# Patient Record
Sex: FEMALE | Race: ASIAN | Employment: OTHER | URBAN - METROPOLITAN AREA
[De-identification: names, ages, dates, MRNs, and addresses within clinical notes are randomized per-mention and may not be internally consistent; named-entity substitution may affect disease eponyms.]

---

## 2024-01-01 ENCOUNTER — APPOINTMENT (OUTPATIENT)
Dept: GENERAL RADIOLOGY | Facility: HOSPITAL | Age: 69
DRG: 870 | End: 2024-01-01
Attending: INTERNAL MEDICINE

## 2024-01-01 ENCOUNTER — APPOINTMENT (OUTPATIENT)
Dept: CT IMAGING | Facility: HOSPITAL | Age: 69
DRG: 870 | End: 2024-01-01
Attending: EMERGENCY MEDICINE

## 2024-01-01 ENCOUNTER — HOSPITAL ENCOUNTER (INPATIENT)
Facility: HOSPITAL | Age: 69
LOS: 5 days | DRG: 870 | End: 2024-01-01
Attending: EMERGENCY MEDICINE | Admitting: HOSPITALIST

## 2024-01-01 ENCOUNTER — APPOINTMENT (OUTPATIENT)
Dept: GENERAL RADIOLOGY | Facility: HOSPITAL | Age: 69
DRG: 870 | End: 2024-01-01
Attending: EMERGENCY MEDICINE

## 2024-01-01 ENCOUNTER — APPOINTMENT (OUTPATIENT)
Dept: GENERAL RADIOLOGY | Facility: HOSPITAL | Age: 69
DRG: 870 | End: 2024-01-01
Attending: RADIOLOGY

## 2024-01-01 ENCOUNTER — APPOINTMENT (OUTPATIENT)
Dept: CV DIAGNOSTICS | Facility: HOSPITAL | Age: 69
DRG: 870 | End: 2024-01-01
Attending: INTERNAL MEDICINE

## 2024-01-01 ENCOUNTER — APPOINTMENT (OUTPATIENT)
Dept: CT IMAGING | Facility: HOSPITAL | Age: 69
DRG: 870 | End: 2024-01-01
Attending: Other

## 2024-01-01 ENCOUNTER — APPOINTMENT (OUTPATIENT)
Dept: MRI IMAGING | Facility: HOSPITAL | Age: 69
DRG: 870 | End: 2024-01-01
Attending: Other

## 2024-01-01 ENCOUNTER — APPOINTMENT (OUTPATIENT)
Dept: INTERVENTIONAL RADIOLOGY/VASCULAR | Facility: HOSPITAL | Age: 69
DRG: 870 | End: 2024-01-01
Attending: INTERNAL MEDICINE

## 2024-01-01 VITALS
WEIGHT: 177.69 LBS | HEIGHT: 66 IN | SYSTOLIC BLOOD PRESSURE: 77 MMHG | DIASTOLIC BLOOD PRESSURE: 33 MMHG | OXYGEN SATURATION: 78 % | TEMPERATURE: 100 F | BODY MASS INDEX: 28.56 KG/M2

## 2024-01-01 DIAGNOSIS — E87.5 HYPERKALEMIA: ICD-10-CM

## 2024-01-01 DIAGNOSIS — E87.20 LACTIC ACIDOSIS: ICD-10-CM

## 2024-01-01 DIAGNOSIS — R73.9 HYPERGLYCEMIA: ICD-10-CM

## 2024-01-01 DIAGNOSIS — I46.9 CARDIAC ARREST (HCC): Primary | ICD-10-CM

## 2024-01-01 DIAGNOSIS — N17.9 ACUTE KIDNEY INJURY (HCC): ICD-10-CM

## 2024-01-01 DIAGNOSIS — R74.01 TRANSAMINITIS: ICD-10-CM

## 2024-01-01 DIAGNOSIS — R79.89 ELEVATED TROPONIN: ICD-10-CM

## 2024-01-01 DIAGNOSIS — J90 PLEURAL EFFUSION: ICD-10-CM

## 2024-01-01 LAB
ALBUMIN SERPL-MCNC: 2.2 G/DL (ref 3.2–4.8)
ALBUMIN SERPL-MCNC: 2.4 G/DL (ref 3.2–4.8)
ALBUMIN SERPL-MCNC: 2.5 G/DL (ref 3.2–4.8)
ALBUMIN SERPL-MCNC: 2.7 G/DL (ref 3.2–4.8)
ALBUMIN SERPL-MCNC: 2.9 G/DL (ref 3.2–4.8)
ALBUMIN SERPL-MCNC: 3.3 G/DL (ref 3.2–4.8)
ALBUMIN/GLOB SERPL: 1.2 {RATIO} (ref 1–2)
ALP LIVER SERPL-CCNC: 108 U/L
ALT SERPL-CCNC: 338 U/L
ANION GAP SERPL CALC-SCNC: 11 MMOL/L (ref 0–18)
ANION GAP SERPL CALC-SCNC: 11 MMOL/L (ref 0–18)
ANION GAP SERPL CALC-SCNC: 14 MMOL/L (ref 0–18)
ANION GAP SERPL CALC-SCNC: 15 MMOL/L (ref 0–18)
ANION GAP SERPL CALC-SCNC: 3 MMOL/L (ref 0–18)
ANION GAP SERPL CALC-SCNC: 4 MMOL/L (ref 0–18)
ANION GAP SERPL CALC-SCNC: 5 MMOL/L (ref 0–18)
ANION GAP SERPL CALC-SCNC: 5 MMOL/L (ref 0–18)
ANION GAP SERPL CALC-SCNC: 6 MMOL/L (ref 0–18)
ANION GAP SERPL CALC-SCNC: 6 MMOL/L (ref 0–18)
ANION GAP SERPL CALC-SCNC: 7 MMOL/L (ref 0–18)
ANION GAP SERPL CALC-SCNC: 7 MMOL/L (ref 0–18)
ANION GAP SERPL CALC-SCNC: 8 MMOL/L (ref 0–18)
ANTIBODY SCREEN: NEGATIVE
AST SERPL-CCNC: 682 U/L (ref ?–34)
ATRIAL RATE: 72 BPM
BASE EXCESS BLD CALC-SCNC: -10.1 MMOL/L (ref ?–2)
BASE EXCESS BLD CALC-SCNC: -18.8 MMOL/L (ref ?–2)
BASOPHILS # BLD AUTO: 0.01 X10(3) UL (ref 0–0.2)
BASOPHILS # BLD AUTO: 0.02 X10(3) UL (ref 0–0.2)
BASOPHILS # BLD AUTO: 0.04 X10(3) UL (ref 0–0.2)
BASOPHILS NFR BLD AUTO: 0.1 %
BASOPHILS NFR BLD AUTO: 0.1 %
BASOPHILS NFR BLD AUTO: 0.2 %
BASOPHILS NFR BLD AUTO: 0.3 %
BILIRUB SERPL-MCNC: <0.2 MG/DL (ref 0.2–1.1)
BLOOD TYPE BARCODE: 6200
BUN BLD-MCNC: 14 MG/DL (ref 9–23)
BUN BLD-MCNC: 14 MG/DL (ref 9–23)
BUN BLD-MCNC: 15 MG/DL (ref 9–23)
BUN BLD-MCNC: 17 MG/DL (ref 9–23)
BUN BLD-MCNC: 18 MG/DL (ref 9–23)
BUN BLD-MCNC: 19 MG/DL (ref 9–23)
BUN BLD-MCNC: 22 MG/DL (ref 9–23)
BUN BLD-MCNC: 22 MG/DL (ref 9–23)
BUN BLD-MCNC: 23 MG/DL (ref 9–23)
BUN BLD-MCNC: 30 MG/DL (ref 9–23)
BUN BLD-MCNC: 32 MG/DL (ref 9–23)
BUN BLD-MCNC: 34 MG/DL (ref 9–23)
BUN BLD-MCNC: 59 MG/DL (ref 9–23)
BUN BLD-MCNC: 62 MG/DL (ref 9–23)
BUN BLD-MCNC: 66 MG/DL (ref 9–23)
BUN BLD-MCNC: 70 MG/DL (ref 9–23)
BUN/CREAT SERPL: 7.2 (ref 10–20)
BUN/CREAT SERPL: 7.4 (ref 10–20)
BUN/CREAT SERPL: 7.6 (ref 10–20)
BUN/CREAT SERPL: 7.7 (ref 10–20)
BUN/CREAT SERPL: 7.8 (ref 10–20)
BUN/CREAT SERPL: 8 (ref 10–20)
BUN/CREAT SERPL: 8.1 (ref 10–20)
BUN/CREAT SERPL: 8.3 (ref 10–20)
BUN/CREAT SERPL: 8.5 (ref 10–20)
BUN/CREAT SERPL: 8.5 (ref 10–20)
BUN/CREAT SERPL: 8.9 (ref 10–20)
C DIFF TOX B STL QL: NEGATIVE
CALCIUM BLD-MCNC: 7.4 MG/DL (ref 8.7–10.4)
CALCIUM BLD-MCNC: 7.5 MG/DL (ref 8.7–10.4)
CALCIUM BLD-MCNC: 7.5 MG/DL (ref 8.7–10.4)
CALCIUM BLD-MCNC: 7.6 MG/DL (ref 8.7–10.4)
CALCIUM BLD-MCNC: 7.6 MG/DL (ref 8.7–10.4)
CALCIUM BLD-MCNC: 7.7 MG/DL (ref 8.7–10.4)
CALCIUM BLD-MCNC: 7.7 MG/DL (ref 8.7–10.4)
CALCIUM BLD-MCNC: 7.8 MG/DL (ref 8.7–10.4)
CALCIUM BLD-MCNC: 7.8 MG/DL (ref 8.7–10.4)
CALCIUM BLD-MCNC: 7.9 MG/DL (ref 8.7–10.4)
CALCIUM BLD-MCNC: 8 MG/DL (ref 8.7–10.4)
CALCIUM BLD-MCNC: 8.1 MG/DL (ref 8.7–10.4)
CALCIUM BLD-MCNC: 8.3 MG/DL (ref 8.7–10.4)
CALCIUM BLD-MCNC: 8.8 MG/DL (ref 8.7–10.4)
CHLORIDE SERPL-SCNC: 104 MMOL/L (ref 98–112)
CHLORIDE SERPL-SCNC: 105 MMOL/L (ref 98–112)
CHLORIDE SERPL-SCNC: 106 MMOL/L (ref 98–112)
CHLORIDE SERPL-SCNC: 107 MMOL/L (ref 98–112)
CHLORIDE SERPL-SCNC: 108 MMOL/L (ref 98–112)
CHLORIDE SERPL-SCNC: 110 MMOL/L (ref 98–112)
CHLORIDE SERPL-SCNC: 111 MMOL/L (ref 98–112)
CHLORIDE SERPL-SCNC: 112 MMOL/L (ref 98–112)
CHLORIDE SERPL-SCNC: 113 MMOL/L (ref 98–112)
CHOLEST SERPL-MCNC: 225 MG/DL (ref ?–200)
CK SERPL-CCNC: 140 U/L
CO2 SERPL-SCNC: 15 MMOL/L (ref 21–32)
CO2 SERPL-SCNC: 16 MMOL/L (ref 21–32)
CO2 SERPL-SCNC: 18 MMOL/L (ref 21–32)
CO2 SERPL-SCNC: 20 MMOL/L (ref 21–32)
CO2 SERPL-SCNC: 22 MMOL/L (ref 21–32)
CO2 SERPL-SCNC: 23 MMOL/L (ref 21–32)
CO2 SERPL-SCNC: 24 MMOL/L (ref 21–32)
CO2 SERPL-SCNC: 25 MMOL/L (ref 21–32)
CO2 SERPL-SCNC: 25 MMOL/L (ref 21–32)
CO2 SERPL-SCNC: 26 MMOL/L (ref 21–32)
CO2 SERPL-SCNC: 26 MMOL/L (ref 21–32)
CO2 SERPL-SCNC: 27 MMOL/L (ref 21–32)
CREAT BLD-MCNC: 1.75 MG/DL
CREAT BLD-MCNC: 1.89 MG/DL
CREAT BLD-MCNC: 2.07 MG/DL
CREAT BLD-MCNC: 2.29 MG/DL
CREAT BLD-MCNC: 2.3 MG/DL
CREAT BLD-MCNC: 2.45 MG/DL
CREAT BLD-MCNC: 2.83 MG/DL
CREAT BLD-MCNC: 2.84 MG/DL
CREAT BLD-MCNC: 2.93 MG/DL
CREAT BLD-MCNC: 3.53 MG/DL
CREAT BLD-MCNC: 4.19 MG/DL
CREAT BLD-MCNC: 4.22 MG/DL
CREAT BLD-MCNC: 6.66 MG/DL
CREAT BLD-MCNC: 7.97 MG/DL
CREAT BLD-MCNC: 8.22 MG/DL
CREAT BLD-MCNC: 8.33 MG/DL
DEPRECATED HBV CORE AB SER IA-ACNC: 379.2 NG/ML
DEPRECATED RDW RBC AUTO: 47.2 FL (ref 35.1–46.3)
DEPRECATED RDW RBC AUTO: 47.7 FL (ref 35.1–46.3)
DEPRECATED RDW RBC AUTO: 47.8 FL (ref 35.1–46.3)
DEPRECATED RDW RBC AUTO: 48.7 FL (ref 35.1–46.3)
DEPRECATED RDW RBC AUTO: 48.8 FL (ref 35.1–46.3)
DEPRECATED RDW RBC AUTO: 51.8 FL (ref 35.1–46.3)
E FAECIUM DNA BLD POS QL NAA+NON-PROBE: DETECTED
EGFRCR SERPLBLD CKD-EPI 2021: 11 ML/MIN/1.73M2 (ref 60–?)
EGFRCR SERPLBLD CKD-EPI 2021: 11 ML/MIN/1.73M2 (ref 60–?)
EGFRCR SERPLBLD CKD-EPI 2021: 13 ML/MIN/1.73M2 (ref 60–?)
EGFRCR SERPLBLD CKD-EPI 2021: 17 ML/MIN/1.73M2 (ref 60–?)
EGFRCR SERPLBLD CKD-EPI 2021: 21 ML/MIN/1.73M2 (ref 60–?)
EGFRCR SERPLBLD CKD-EPI 2021: 22 ML/MIN/1.73M2 (ref 60–?)
EGFRCR SERPLBLD CKD-EPI 2021: 22 ML/MIN/1.73M2 (ref 60–?)
EGFRCR SERPLBLD CKD-EPI 2021: 25 ML/MIN/1.73M2 (ref 60–?)
EGFRCR SERPLBLD CKD-EPI 2021: 28 ML/MIN/1.73M2 (ref 60–?)
EGFRCR SERPLBLD CKD-EPI 2021: 31 ML/MIN/1.73M2 (ref 60–?)
EGFRCR SERPLBLD CKD-EPI 2021: 5 ML/MIN/1.73M2 (ref 60–?)
EGFRCR SERPLBLD CKD-EPI 2021: 6 ML/MIN/1.73M2 (ref 60–?)
EOSINOPHIL # BLD AUTO: 0.01 X10(3) UL (ref 0–0.7)
EOSINOPHIL # BLD AUTO: 0.03 X10(3) UL (ref 0–0.7)
EOSINOPHIL # BLD AUTO: 0.13 X10(3) UL (ref 0–0.7)
EOSINOPHIL # BLD AUTO: 0.14 X10(3) UL (ref 0–0.7)
EOSINOPHIL # BLD AUTO: 0.18 X10(3) UL (ref 0–0.7)
EOSINOPHIL # BLD AUTO: 0.23 X10(3) UL (ref 0–0.7)
EOSINOPHIL NFR BLD AUTO: 0.1 %
EOSINOPHIL NFR BLD AUTO: 0.2 %
EOSINOPHIL NFR BLD AUTO: 1 %
EOSINOPHIL NFR BLD AUTO: 1.1 %
EOSINOPHIL NFR BLD AUTO: 1.4 %
EOSINOPHIL NFR BLD AUTO: 1.8 %
ERYTHROCYTE [DISTWIDTH] IN BLOOD BY AUTOMATED COUNT: 14.9 % (ref 11–15)
ERYTHROCYTE [DISTWIDTH] IN BLOOD BY AUTOMATED COUNT: 15.1 % (ref 11–15)
ERYTHROCYTE [DISTWIDTH] IN BLOOD BY AUTOMATED COUNT: 15.3 % (ref 11–15)
ERYTHROCYTE [DISTWIDTH] IN BLOOD BY AUTOMATED COUNT: 15.3 % (ref 11–15)
ERYTHROCYTE [DISTWIDTH] IN BLOOD BY AUTOMATED COUNT: 15.7 % (ref 11–15)
ERYTHROCYTE [DISTWIDTH] IN BLOOD BY AUTOMATED COUNT: 15.8 % (ref 11–15)
EST. AVERAGE GLUCOSE BLD GHB EST-MCNC: 151 MG/DL (ref 68–126)
ETHANOL SERPL-MCNC: <3 MG/DL (ref ?–3)
GLOBULIN PLAS-MCNC: 2.8 G/DL (ref 2–3.5)
GLUCOSE BLD-MCNC: 100 MG/DL (ref 70–99)
GLUCOSE BLD-MCNC: 106 MG/DL (ref 70–99)
GLUCOSE BLD-MCNC: 112 MG/DL (ref 70–99)
GLUCOSE BLD-MCNC: 126 MG/DL (ref 70–99)
GLUCOSE BLD-MCNC: 134 MG/DL (ref 70–99)
GLUCOSE BLD-MCNC: 139 MG/DL (ref 70–99)
GLUCOSE BLD-MCNC: 156 MG/DL (ref 70–99)
GLUCOSE BLD-MCNC: 251 MG/DL (ref 70–99)
GLUCOSE BLD-MCNC: 251 MG/DL (ref 70–99)
GLUCOSE BLD-MCNC: 363 MG/DL (ref 70–99)
GLUCOSE BLD-MCNC: 71 MG/DL (ref 70–99)
GLUCOSE BLD-MCNC: 80 MG/DL (ref 70–99)
GLUCOSE BLD-MCNC: 82 MG/DL (ref 70–99)
GLUCOSE BLD-MCNC: 83 MG/DL (ref 70–99)
GLUCOSE BLD-MCNC: 84 MG/DL (ref 70–99)
GLUCOSE BLD-MCNC: 85 MG/DL (ref 70–99)
GLUCOSE BLDC GLUCOMTR-MCNC: 111 MG/DL (ref 70–99)
GLUCOSE BLDC GLUCOMTR-MCNC: 115 MG/DL (ref 70–99)
GLUCOSE BLDC GLUCOMTR-MCNC: 115 MG/DL (ref 70–99)
GLUCOSE BLDC GLUCOMTR-MCNC: 118 MG/DL (ref 70–99)
GLUCOSE BLDC GLUCOMTR-MCNC: 122 MG/DL (ref 70–99)
GLUCOSE BLDC GLUCOMTR-MCNC: 125 MG/DL (ref 70–99)
GLUCOSE BLDC GLUCOMTR-MCNC: 128 MG/DL (ref 70–99)
GLUCOSE BLDC GLUCOMTR-MCNC: 141 MG/DL (ref 70–99)
GLUCOSE BLDC GLUCOMTR-MCNC: 148 MG/DL (ref 70–99)
GLUCOSE BLDC GLUCOMTR-MCNC: 154 MG/DL (ref 70–99)
GLUCOSE BLDC GLUCOMTR-MCNC: 164 MG/DL (ref 70–99)
GLUCOSE BLDC GLUCOMTR-MCNC: 202 MG/DL (ref 70–99)
GLUCOSE BLDC GLUCOMTR-MCNC: 253 MG/DL (ref 70–99)
GLUCOSE BLDC GLUCOMTR-MCNC: 299 MG/DL (ref 70–99)
GLUCOSE BLDC GLUCOMTR-MCNC: 329 MG/DL (ref 70–99)
GLUCOSE BLDC GLUCOMTR-MCNC: 382 MG/DL (ref 70–99)
GLUCOSE BLDC GLUCOMTR-MCNC: 81 MG/DL (ref 70–99)
GLUCOSE BLDC GLUCOMTR-MCNC: 94 MG/DL (ref 70–99)
GLUCOSE BLDC GLUCOMTR-MCNC: 95 MG/DL (ref 70–99)
GLUCOSE BLDC GLUCOMTR-MCNC: 96 MG/DL (ref 70–99)
GLUCOSE BLDC GLUCOMTR-MCNC: 97 MG/DL (ref 70–99)
GLUCOSE BLDC GLUCOMTR-MCNC: 98 MG/DL (ref 70–99)
HBA1C MFR BLD: 6.9 % (ref ?–5.7)
HBV CORE AB SERPL QL IA: NONREACTIVE
HBV SURFACE AB SER QL: NONREACTIVE
HBV SURFACE AB SERPL IA-ACNC: <3.1 MIU/ML
HBV SURFACE AG SER-ACNC: 0.14 [IU]/L
HBV SURFACE AG SERPL QL IA: NONREACTIVE
HCO3 BLDA-SCNC: 10.2 MEQ/L (ref 21–27)
HCO3 BLDA-SCNC: 17.1 MEQ/L (ref 21–27)
HCT VFR BLD AUTO: 19.5 %
HCT VFR BLD AUTO: 21.9 %
HCT VFR BLD AUTO: 23.3 %
HCT VFR BLD AUTO: 23.8 %
HCT VFR BLD AUTO: 25.9 %
HCT VFR BLD AUTO: 26.4 %
HCT VFR BLD AUTO: 32.2 %
HDLC SERPL-MCNC: 33 MG/DL (ref 40–59)
HGB BLD-MCNC: 6.5 G/DL
HGB BLD-MCNC: 7 G/DL
HGB BLD-MCNC: 7.7 G/DL
HGB BLD-MCNC: 7.8 G/DL
HGB BLD-MCNC: 8 G/DL
HGB BLD-MCNC: 8.2 G/DL
HGB BLD-MCNC: 8.7 G/DL
HGB BLD-MCNC: 9.9 G/DL
IMM GRANULOCYTES # BLD AUTO: 0.06 X10(3) UL (ref 0–1)
IMM GRANULOCYTES # BLD AUTO: 0.06 X10(3) UL (ref 0–1)
IMM GRANULOCYTES # BLD AUTO: 0.08 X10(3) UL (ref 0–1)
IMM GRANULOCYTES # BLD AUTO: 0.11 X10(3) UL (ref 0–1)
IMM GRANULOCYTES # BLD AUTO: 0.13 X10(3) UL (ref 0–1)
IMM GRANULOCYTES # BLD AUTO: 0.17 X10(3) UL (ref 0–1)
IMM GRANULOCYTES NFR BLD: 0.5 %
IMM GRANULOCYTES NFR BLD: 0.5 %
IMM GRANULOCYTES NFR BLD: 0.6 %
IMM GRANULOCYTES NFR BLD: 0.9 %
IMM GRANULOCYTES NFR BLD: 1 %
IMM GRANULOCYTES NFR BLD: 1.3 %
IRON SATN MFR SERPL: 38 %
IRON SERPL-MCNC: 70 UG/DL
LACTATE BLD-SCNC: 7.9 MMOL/L (ref 0.5–2)
LACTATE SERPL-SCNC: 2.9 MMOL/L (ref 0.5–2)
LACTATE SERPL-SCNC: 4 MMOL/L (ref 0.5–2)
LACTATE SERPL-SCNC: 8.9 MMOL/L (ref 0.5–2)
LDLC SERPL CALC-MCNC: 140 MG/DL (ref ?–100)
LYMPHOCYTES # BLD AUTO: 0.93 X10(3) UL (ref 1–4)
LYMPHOCYTES # BLD AUTO: 1.12 X10(3) UL (ref 1–4)
LYMPHOCYTES # BLD AUTO: 1.24 X10(3) UL (ref 1–4)
LYMPHOCYTES # BLD AUTO: 1.4 X10(3) UL (ref 1–4)
LYMPHOCYTES # BLD AUTO: 1.44 X10(3) UL (ref 1–4)
LYMPHOCYTES # BLD AUTO: 9.6 X10(3) UL (ref 1–4)
LYMPHOCYTES NFR BLD AUTO: 10.8 %
LYMPHOCYTES NFR BLD AUTO: 11.4 %
LYMPHOCYTES NFR BLD AUTO: 6.8 %
LYMPHOCYTES NFR BLD AUTO: 74.2 %
LYMPHOCYTES NFR BLD AUTO: 8.5 %
LYMPHOCYTES NFR BLD AUTO: 9.9 %
MAGNESIUM SERPL-MCNC: 1.6 MG/DL (ref 1.6–2.6)
MAGNESIUM SERPL-MCNC: 1.7 MG/DL (ref 1.6–2.6)
MAGNESIUM SERPL-MCNC: 1.8 MG/DL (ref 1.6–2.6)
MAGNESIUM SERPL-MCNC: 2.1 MG/DL (ref 1.6–2.6)
MAGNESIUM SERPL-MCNC: 2.1 MG/DL (ref 1.6–2.6)
MAGNESIUM SERPL-MCNC: 2.7 MG/DL (ref 1.6–2.6)
MCH RBC QN AUTO: 27.2 PG (ref 26–34)
MCH RBC QN AUTO: 27.3 PG (ref 26–34)
MCH RBC QN AUTO: 28.2 PG (ref 26–34)
MCH RBC QN AUTO: 28.3 PG (ref 26–34)
MCH RBC QN AUTO: 28.4 PG (ref 26–34)
MCH RBC QN AUTO: 28.7 PG (ref 26–34)
MCHC RBC AUTO-ENTMCNC: 30.7 G/DL (ref 31–37)
MCHC RBC AUTO-ENTMCNC: 31.1 G/DL (ref 31–37)
MCHC RBC AUTO-ENTMCNC: 32 G/DL (ref 31–37)
MCHC RBC AUTO-ENTMCNC: 33 G/DL (ref 31–37)
MCHC RBC AUTO-ENTMCNC: 33.3 G/DL (ref 31–37)
MCHC RBC AUTO-ENTMCNC: 33.6 G/DL (ref 31–37)
MCV RBC AUTO: 85.2 FL
MCV RBC AUTO: 85.3 FL
MCV RBC AUTO: 85.5 FL
MCV RBC AUTO: 85.7 FL
MCV RBC AUTO: 87.7 FL
MCV RBC AUTO: 91.7 FL
MODIFIED ALLEN TEST: POSITIVE
MODIFIED ALLEN TEST: POSITIVE
MONOCYTES # BLD AUTO: 0.55 X10(3) UL (ref 0.1–1)
MONOCYTES # BLD AUTO: 0.57 X10(3) UL (ref 0.1–1)
MONOCYTES # BLD AUTO: 0.58 X10(3) UL (ref 0.1–1)
MONOCYTES # BLD AUTO: 0.64 X10(3) UL (ref 0.1–1)
MONOCYTES # BLD AUTO: 0.69 X10(3) UL (ref 0.1–1)
MONOCYTES # BLD AUTO: 0.84 X10(3) UL (ref 0.1–1)
MONOCYTES NFR BLD AUTO: 4.3 %
MONOCYTES NFR BLD AUTO: 4.3 %
MONOCYTES NFR BLD AUTO: 4.6 %
MONOCYTES NFR BLD AUTO: 5.2 %
MONOCYTES NFR BLD AUTO: 5.2 %
MONOCYTES NFR BLD AUTO: 6.2 %
MRSA DNA SPEC QL NAA+PROBE: NEGATIVE
NEUTROPHILS # BLD AUTO: 10.63 X10 (3) UL (ref 1.5–7.7)
NEUTROPHILS # BLD AUTO: 10.63 X10(3) UL (ref 1.5–7.7)
NEUTROPHILS # BLD AUTO: 11.1 X10 (3) UL (ref 1.5–7.7)
NEUTROPHILS # BLD AUTO: 11.1 X10(3) UL (ref 1.5–7.7)
NEUTROPHILS # BLD AUTO: 11.11 X10 (3) UL (ref 1.5–7.7)
NEUTROPHILS # BLD AUTO: 11.11 X10(3) UL (ref 1.5–7.7)
NEUTROPHILS # BLD AUTO: 11.71 X10 (3) UL (ref 1.5–7.7)
NEUTROPHILS # BLD AUTO: 11.71 X10(3) UL (ref 1.5–7.7)
NEUTROPHILS # BLD AUTO: 2.35 X10 (3) UL (ref 1.5–7.7)
NEUTROPHILS # BLD AUTO: 2.35 X10(3) UL (ref 1.5–7.7)
NEUTROPHILS # BLD AUTO: 9.98 X10 (3) UL (ref 1.5–7.7)
NEUTROPHILS # BLD AUTO: 9.98 X10(3) UL (ref 1.5–7.7)
NEUTROPHILS NFR BLD AUTO: 18.1 %
NEUTROPHILS NFR BLD AUTO: 81.2 %
NEUTROPHILS NFR BLD AUTO: 82.8 %
NEUTROPHILS NFR BLD AUTO: 84.2 %
NEUTROPHILS NFR BLD AUTO: 84.7 %
NEUTROPHILS NFR BLD AUTO: 86.1 %
NONHDLC SERPL-MCNC: 192 MG/DL (ref ?–130)
O2 CT BLD-SCNC: 11.6 VOL% (ref 15–23)
O2 CT BLD-SCNC: 12 VOL% (ref 15–23)
O2/TOTAL GAS SETTING VFR VENT: 100 %
O2/TOTAL GAS SETTING VFR VENT: 60 %
OSMOLALITY SERPL CALC.SUM OF ELEC: 281 MOSM/KG (ref 275–295)
OSMOLALITY SERPL CALC.SUM OF ELEC: 282 MOSM/KG (ref 275–295)
OSMOLALITY SERPL CALC.SUM OF ELEC: 284 MOSM/KG (ref 275–295)
OSMOLALITY SERPL CALC.SUM OF ELEC: 286 MOSM/KG (ref 275–295)
OSMOLALITY SERPL CALC.SUM OF ELEC: 287 MOSM/KG (ref 275–295)
OSMOLALITY SERPL CALC.SUM OF ELEC: 287 MOSM/KG (ref 275–295)
OSMOLALITY SERPL CALC.SUM OF ELEC: 288 MOSM/KG (ref 275–295)
OSMOLALITY SERPL CALC.SUM OF ELEC: 291 MOSM/KG (ref 275–295)
OSMOLALITY SERPL CALC.SUM OF ELEC: 291 MOSM/KG (ref 275–295)
OSMOLALITY SERPL CALC.SUM OF ELEC: 296 MOSM/KG (ref 275–295)
OSMOLALITY SERPL CALC.SUM OF ELEC: 296 MOSM/KG (ref 275–295)
OSMOLALITY SERPL CALC.SUM OF ELEC: 297 MOSM/KG (ref 275–295)
OSMOLALITY SERPL CALC.SUM OF ELEC: 314 MOSM/KG (ref 275–295)
OSMOLALITY SERPL CALC.SUM OF ELEC: 318 MOSM/KG (ref 275–295)
OSMOLALITY SERPL CALC.SUM OF ELEC: 322 MOSM/KG (ref 275–295)
OSMOLALITY SERPL CALC.SUM OF ELEC: 323 MOSM/KG (ref 275–295)
P AXIS: 48 DEGREES
P-R INTERVAL: 94 MS
PCO2 BLDA: 28 MM HG (ref 35–45)
PCO2 BLDA: 67 MM HG (ref 35–45)
PEEP SETTING VENT: 5 CM H2O
PEEP SETTING VENT: 5 CM H2O
PH BLDA: 6.91 [PH] (ref 7.35–7.45)
PH BLDA: 7.33 [PH] (ref 7.35–7.45)
PHOSPHATE SERPL-MCNC: 2.1 MG/DL (ref 2.4–5.1)
PHOSPHATE SERPL-MCNC: 2.8 MG/DL (ref 2.4–5.1)
PHOSPHATE SERPL-MCNC: 3 MG/DL (ref 2.4–5.1)
PHOSPHATE SERPL-MCNC: 3.7 MG/DL (ref 2.4–5.1)
PHOSPHATE SERPL-MCNC: 7 MG/DL (ref 2.4–5.1)
PLATELET # BLD AUTO: 101 10(3)UL (ref 150–450)
PLATELET # BLD AUTO: 182 10(3)UL (ref 150–450)
PLATELET # BLD AUTO: 227 10(3)UL (ref 150–450)
PLATELET # BLD AUTO: 82 10(3)UL (ref 150–450)
PLATELET # BLD AUTO: 85 10(3)UL (ref 150–450)
PLATELET # BLD AUTO: 91 10(3)UL (ref 150–450)
PLATELETS.RETICULATED NFR BLD AUTO: 8.1 % (ref 0–7)
PLATELETS.RETICULATED NFR BLD AUTO: 8.3 % (ref 0–7)
PLATELETS.RETICULATED NFR BLD AUTO: 9.1 % (ref 0–7)
PLATELETS.RETICULATED NFR BLD AUTO: 9.5 % (ref 0–7)
PO2 BLDA: 220 MM HG (ref 80–100)
PO2 BLDA: 97 MM HG (ref 80–100)
POTASSIUM SERPL-SCNC: 3.9 MMOL/L (ref 3.5–5.1)
POTASSIUM SERPL-SCNC: 4 MMOL/L (ref 3.5–5.1)
POTASSIUM SERPL-SCNC: 4 MMOL/L (ref 3.5–5.1)
POTASSIUM SERPL-SCNC: 4.1 MMOL/L (ref 3.5–5.1)
POTASSIUM SERPL-SCNC: 4.2 MMOL/L (ref 3.5–5.1)
POTASSIUM SERPL-SCNC: 4.3 MMOL/L (ref 3.5–5.1)
POTASSIUM SERPL-SCNC: 4.3 MMOL/L (ref 3.5–5.1)
POTASSIUM SERPL-SCNC: 4.4 MMOL/L (ref 3.5–5.1)
POTASSIUM SERPL-SCNC: 4.5 MMOL/L (ref 3.5–5.1)
POTASSIUM SERPL-SCNC: 4.6 MMOL/L (ref 3.5–5.1)
POTASSIUM SERPL-SCNC: 5.5 MMOL/L (ref 3.5–5.1)
PROT SERPL-MCNC: 6.1 G/DL (ref 5.7–8.2)
PUNCTURE CHARGE: YES
PUNCTURE CHARGE: YES
Q-T INTERVAL: 394 MS
QRS DURATION: 92 MS
QTC CALCULATION (BEZET): 431 MS
R AXIS: -41 DEGREES
RBC # BLD AUTO: 2.29 X10(6)UL
RBC # BLD AUTO: 2.56 X10(6)UL
RBC # BLD AUTO: 2.72 X10(6)UL
RBC # BLD AUTO: 2.79 X10(6)UL
RBC # BLD AUTO: 3.01 X10(6)UL
RBC # BLD AUTO: 3.51 X10(6)UL
RESP RATE: 12 BPM
RESP RATE: 14 BPM
RH BLOOD TYPE: POSITIVE
RH BLOOD TYPE: POSITIVE
SAO2 % BLDA: 96.3 % (ref 94–100)
SAO2 % BLDA: 98.9 % (ref 94–100)
SODIUM SERPL-SCNC: 133 MMOL/L (ref 136–145)
SODIUM SERPL-SCNC: 135 MMOL/L (ref 136–145)
SODIUM SERPL-SCNC: 135 MMOL/L (ref 136–145)
SODIUM SERPL-SCNC: 136 MMOL/L (ref 136–145)
SODIUM SERPL-SCNC: 138 MMOL/L (ref 136–145)
SODIUM SERPL-SCNC: 138 MMOL/L (ref 136–145)
SODIUM SERPL-SCNC: 139 MMOL/L (ref 136–145)
SODIUM SERPL-SCNC: 140 MMOL/L (ref 136–145)
SODIUM SERPL-SCNC: 142 MMOL/L (ref 136–145)
SPECIMEN VOL 24H UR: 450 ML
SPECIMEN VOL 24H UR: 500 ML
T AXIS: 192 DEGREES
TIBC SERPL-MCNC: 183 UG/DL (ref 250–425)
TRANSFERRIN SERPL-MCNC: 123 MG/DL (ref 250–380)
TRIGL SERPL-MCNC: 286 MG/DL (ref 30–149)
TROPONIN I SERPL HS-MCNC: 347 NG/L
TROPONIN I SERPL HS-MCNC: 609 NG/L
TSI SER-ACNC: 3.67 MIU/ML (ref 0.55–4.78)
UNIT VOLUME: 350 ML
VANA+VANB BLD POS QL NAA+NON-PROBE: NOT DETECTED
VENTRICULAR RATE: 72 BPM
VIT B12 SERPL-MCNC: 1320 PG/ML (ref 211–911)
VLDLC SERPL CALC-MCNC: 54 MG/DL (ref 0–30)
WBC # BLD AUTO: 12.3 X10(3) UL (ref 4–11)
WBC # BLD AUTO: 12.5 X10(3) UL (ref 4–11)
WBC # BLD AUTO: 12.9 X10(3) UL (ref 4–11)
WBC # BLD AUTO: 13.2 X10(3) UL (ref 4–11)
WBC # BLD AUTO: 13.4 X10(3) UL (ref 4–11)
WBC # BLD AUTO: 13.6 X10(3) UL (ref 4–11)

## 2024-01-01 PROCEDURE — 95816 EEG AWAKE AND DROWSY: CPT | Performed by: OTHER

## 2024-01-01 PROCEDURE — 30233N1 TRANSFUSION OF NONAUTOLOGOUS RED BLOOD CELLS INTO PERIPHERAL VEIN, PERCUTANEOUS APPROACH: ICD-10-PCS | Performed by: INTERNAL MEDICINE

## 2024-01-01 PROCEDURE — 99233 SBSQ HOSP IP/OBS HIGH 50: CPT | Performed by: OTHER

## 2024-01-01 PROCEDURE — 71045 X-RAY EXAM CHEST 1 VIEW: CPT | Performed by: INTERNAL MEDICINE

## 2024-01-01 PROCEDURE — 02PYX3Z REMOVAL OF INFUSION DEVICE FROM GREAT VESSEL, EXTERNAL APPROACH: ICD-10-PCS | Performed by: RADIOLOGY

## 2024-01-01 PROCEDURE — 0BH17EZ INSERTION OF ENDOTRACHEAL AIRWAY INTO TRACHEA, VIA NATURAL OR ARTIFICIAL OPENING: ICD-10-PCS | Performed by: EMERGENCY MEDICINE

## 2024-01-01 PROCEDURE — 02HV33Z INSERTION OF INFUSION DEVICE INTO SUPERIOR VENA CAVA, PERCUTANEOUS APPROACH: ICD-10-PCS | Performed by: RADIOLOGY

## 2024-01-01 PROCEDURE — 02HV33Z INSERTION OF INFUSION DEVICE INTO SUPERIOR VENA CAVA, PERCUTANEOUS APPROACH: ICD-10-PCS | Performed by: EMERGENCY MEDICINE

## 2024-01-01 PROCEDURE — 71045 X-RAY EXAM CHEST 1 VIEW: CPT | Performed by: RADIOLOGY

## 2024-01-01 PROCEDURE — 71045 X-RAY EXAM CHEST 1 VIEW: CPT | Performed by: EMERGENCY MEDICINE

## 2024-01-01 PROCEDURE — 36556 INSERT NON-TUNNEL CV CATH: CPT | Performed by: EMERGENCY MEDICINE

## 2024-01-01 PROCEDURE — 5A1D90Z PERFORMANCE OF URINARY FILTRATION, CONTINUOUS, GREATER THAN 18 HOURS PER DAY: ICD-10-PCS | Performed by: INTERNAL MEDICINE

## 2024-01-01 PROCEDURE — 93306 TTE W/DOPPLER COMPLETE: CPT | Performed by: INTERNAL MEDICINE

## 2024-01-01 PROCEDURE — 99222 1ST HOSP IP/OBS MODERATE 55: CPT | Performed by: OTHER

## 2024-01-01 PROCEDURE — 70450 CT HEAD/BRAIN W/O DYE: CPT | Performed by: OTHER

## 2024-01-01 PROCEDURE — 99232 SBSQ HOSP IP/OBS MODERATE 35: CPT | Performed by: OTHER

## 2024-01-01 PROCEDURE — 70450 CT HEAD/BRAIN W/O DYE: CPT | Performed by: EMERGENCY MEDICINE

## 2024-01-01 PROCEDURE — 5A1955Z RESPIRATORY VENTILATION, GREATER THAN 96 CONSECUTIVE HOURS: ICD-10-PCS | Performed by: INTERNAL MEDICINE

## 2024-01-01 PROCEDURE — 76937 US GUIDE VASCULAR ACCESS: CPT | Performed by: EMERGENCY MEDICINE

## 2024-01-01 PROCEDURE — 99291 CRITICAL CARE FIRST HOUR: CPT | Performed by: OTHER

## 2024-01-01 RX ORDER — MIDAZOLAM HYDROCHLORIDE 1 MG/ML
INJECTION INTRAMUSCULAR; INTRAVENOUS
Status: DISCONTINUED
Start: 2024-01-01 | End: 2024-01-01

## 2024-01-01 RX ORDER — LIDOCAINE HYDROCHLORIDE 20 MG/ML
INJECTION, SOLUTION INFILTRATION; PERINEURAL
Status: COMPLETED
Start: 2024-01-01 | End: 2024-01-01

## 2024-01-01 RX ORDER — NICOTINE POLACRILEX 4 MG
15 LOZENGE BUCCAL
Status: DISCONTINUED | OUTPATIENT
Start: 2024-01-01 | End: 2024-01-01

## 2024-01-01 RX ORDER — MIDAZOLAM HYDROCHLORIDE 1 MG/ML
2 INJECTION INTRAMUSCULAR; INTRAVENOUS ONCE
Status: COMPLETED | OUTPATIENT
Start: 2024-01-01 | End: 2024-01-01

## 2024-01-01 RX ORDER — SODIUM BICARBONATE 650 MG/1
325 TABLET ORAL AS NEEDED
Status: DISCONTINUED | OUTPATIENT
Start: 2024-01-01 | End: 2024-01-01

## 2024-01-01 RX ORDER — CALCIUM GLUCONATE 10 MG/ML
1 INJECTION, SOLUTION INTRAVENOUS ONCE
Status: COMPLETED | OUTPATIENT
Start: 2024-01-01 | End: 2024-01-01

## 2024-01-01 RX ORDER — HEPARIN SODIUM 1000 [USP'U]/ML
INJECTION, SOLUTION INTRAVENOUS; SUBCUTANEOUS
Status: COMPLETED
Start: 2024-01-01 | End: 2024-01-01

## 2024-01-01 RX ORDER — NICOTINE POLACRILEX 4 MG
30 LOZENGE BUCCAL
Status: DISCONTINUED | OUTPATIENT
Start: 2024-01-01 | End: 2024-01-01

## 2024-01-01 RX ORDER — CHLORHEXIDINE GLUCONATE ORAL RINSE 1.2 MG/ML
15 SOLUTION DENTAL
Status: DISCONTINUED | OUTPATIENT
Start: 2024-01-01 | End: 2024-01-01

## 2024-01-01 RX ORDER — DEXTROSE MONOHYDRATE 25 G/50ML
50 INJECTION, SOLUTION INTRAVENOUS ONCE
Status: COMPLETED | OUTPATIENT
Start: 2024-01-01 | End: 2024-01-01

## 2024-01-01 RX ORDER — ONDANSETRON 2 MG/ML
4 INJECTION INTRAMUSCULAR; INTRAVENOUS EVERY 6 HOURS PRN
Status: DISCONTINUED | OUTPATIENT
Start: 2024-01-01 | End: 2024-01-01

## 2024-01-01 RX ORDER — ACETAMINOPHEN 500 MG
500 TABLET ORAL EVERY 4 HOURS PRN
Status: DISCONTINUED | OUTPATIENT
Start: 2024-01-01 | End: 2024-01-01

## 2024-01-01 RX ORDER — LORAZEPAM 2 MG/ML
1 INJECTION INTRAMUSCULAR ONCE
Status: COMPLETED | OUTPATIENT
Start: 2024-01-01 | End: 2024-01-01

## 2024-01-01 RX ORDER — ACETAMINOPHEN 10 MG/ML
1000 INJECTION, SOLUTION INTRAVENOUS EVERY 6 HOURS PRN
Status: DISCONTINUED | OUTPATIENT
Start: 2024-01-01 | End: 2024-01-01

## 2024-01-01 RX ORDER — LEVETIRACETAM 500 MG/5ML
500 INJECTION, SOLUTION, CONCENTRATE INTRAVENOUS EVERY 24 HOURS
Status: DISCONTINUED | OUTPATIENT
Start: 2024-01-01 | End: 2024-01-01

## 2024-01-01 RX ORDER — SODIUM CHLORIDE 9 MG/ML
INJECTION, SOLUTION INTRAVENOUS ONCE
Status: COMPLETED | OUTPATIENT
Start: 2024-01-01 | End: 2024-01-01

## 2024-01-01 RX ORDER — POLYETHYLENE GLYCOL 3350 17 G/17G
17 POWDER, FOR SOLUTION ORAL DAILY PRN
Status: DISCONTINUED | OUTPATIENT
Start: 2024-01-01 | End: 2024-01-01

## 2024-01-01 RX ORDER — VANCOMYCIN 2 GRAM/500 ML IN 0.9 % SODIUM CHLORIDE INTRAVENOUS
25 ONCE
Status: COMPLETED | OUTPATIENT
Start: 2024-01-01 | End: 2024-01-01

## 2024-01-01 RX ORDER — LORAZEPAM 2 MG/ML
2 INJECTION INTRAMUSCULAR EVERY 30 MIN PRN
Status: DISCONTINUED | OUTPATIENT
Start: 2024-01-01 | End: 2024-01-01

## 2024-01-01 RX ORDER — SODIUM CHLORIDE 0.9 % (FLUSH) 0.9 %
10 SYRINGE (ML) INJECTION AS NEEDED
Status: DISCONTINUED | OUTPATIENT
Start: 2024-01-01 | End: 2024-01-01

## 2024-01-01 RX ORDER — LORAZEPAM 2 MG/ML
INJECTION INTRAMUSCULAR
Status: DISCONTINUED
Start: 2024-01-01 | End: 2024-01-01

## 2024-01-01 RX ORDER — ACETAMINOPHEN 160 MG/5ML
650 SOLUTION ORAL EVERY 4 HOURS PRN
Status: DISCONTINUED | OUTPATIENT
Start: 2024-01-01 | End: 2024-01-01

## 2024-01-01 RX ORDER — SENNOSIDES 8.6 MG
17.2 TABLET ORAL NIGHTLY PRN
Status: DISCONTINUED | OUTPATIENT
Start: 2024-01-01 | End: 2024-01-01

## 2024-01-01 RX ORDER — HALOPERIDOL 5 MG/ML
2 INJECTION INTRAMUSCULAR
Status: DISCONTINUED | OUTPATIENT
Start: 2024-01-01 | End: 2024-01-01

## 2024-01-01 RX ORDER — DEXTROSE MONOHYDRATE 25 G/50ML
50 INJECTION, SOLUTION INTRAVENOUS
Status: DISCONTINUED | OUTPATIENT
Start: 2024-01-01 | End: 2024-01-01

## 2024-01-01 RX ORDER — INSULIN DEGLUDEC 100 U/ML
8 INJECTION, SOLUTION SUBCUTANEOUS DAILY
Status: DISCONTINUED | OUTPATIENT
Start: 2024-01-01 | End: 2024-01-01

## 2024-01-01 RX ORDER — DEXTROSE MONOHYDRATE 50 MG/ML
INJECTION, SOLUTION INTRAVENOUS DAILY PRN
Status: DISCONTINUED | OUTPATIENT
Start: 2024-01-01 | End: 2024-01-01

## 2024-01-01 RX ORDER — VANCOMYCIN HYDROCHLORIDE
15 EVERY 24 HOURS
Status: DISCONTINUED | OUTPATIENT
Start: 2024-01-01 | End: 2024-01-01

## 2024-01-01 RX ORDER — HYDRALAZINE HYDROCHLORIDE 20 MG/ML
10 INJECTION INTRAMUSCULAR; INTRAVENOUS EVERY 6 HOURS PRN
Status: DISCONTINUED | OUTPATIENT
Start: 2024-01-01 | End: 2024-01-01

## 2024-01-01 RX ORDER — HEPARIN SODIUM 5000 [USP'U]/ML
5000 INJECTION, SOLUTION INTRAVENOUS; SUBCUTANEOUS EVERY 8 HOURS SCHEDULED
Status: DISCONTINUED | OUTPATIENT
Start: 2024-01-01 | End: 2024-01-01

## 2024-01-01 RX ORDER — ACETAMINOPHEN 325 MG/1
650 TABLET ORAL EVERY 4 HOURS PRN
Status: DISCONTINUED | OUTPATIENT
Start: 2024-01-01 | End: 2024-01-01

## 2024-01-01 RX ORDER — LORAZEPAM 2 MG/ML
2 INJECTION INTRAMUSCULAR ONCE
Status: COMPLETED | OUTPATIENT
Start: 2024-01-01 | End: 2024-01-01

## 2024-01-01 RX ORDER — SCOLOPAMINE TRANSDERMAL SYSTEM 1 MG/1
1 PATCH, EXTENDED RELEASE TRANSDERMAL
Status: DISCONTINUED | OUTPATIENT
Start: 2024-01-01 | End: 2024-01-01

## 2024-01-01 RX ORDER — METOCLOPRAMIDE HYDROCHLORIDE 5 MG/ML
5 INJECTION INTRAMUSCULAR; INTRAVENOUS EVERY 8 HOURS PRN
Status: DISCONTINUED | OUTPATIENT
Start: 2024-01-01 | End: 2024-01-01

## 2024-01-01 RX ORDER — LORAZEPAM 2 MG/ML
INJECTION INTRAMUSCULAR
Status: COMPLETED
Start: 2024-01-01 | End: 2024-01-01

## 2024-01-01 RX ORDER — HALOPERIDOL 5 MG/ML
1 INJECTION INTRAMUSCULAR
Status: DISCONTINUED | OUTPATIENT
Start: 2024-01-01 | End: 2024-01-01

## 2024-01-01 RX ORDER — ACETAMINOPHEN 650 MG/1
650 SUPPOSITORY RECTAL EVERY 4 HOURS PRN
Status: DISCONTINUED | OUTPATIENT
Start: 2024-01-01 | End: 2024-01-01

## 2024-01-01 RX ORDER — BISACODYL 10 MG
10 SUPPOSITORY, RECTAL RECTAL
Status: DISCONTINUED | OUTPATIENT
Start: 2024-01-01 | End: 2024-01-01

## 2024-01-01 RX ORDER — MIDAZOLAM HYDROCHLORIDE 1 MG/ML
1 INJECTION INTRAMUSCULAR; INTRAVENOUS ONCE
Status: DISCONTINUED | OUTPATIENT
Start: 2024-01-01 | End: 2024-01-01

## 2024-06-09 PROBLEM — I46.9 CARDIAC ARREST (HCC): Status: ACTIVE | Noted: 2024-01-01

## 2024-06-10 PROBLEM — E87.5 HYPERKALEMIA: Status: ACTIVE | Noted: 2024-01-01

## 2024-06-10 PROBLEM — J90 PLEURAL EFFUSION: Status: ACTIVE | Noted: 2024-01-01

## 2024-06-10 PROBLEM — G93.1 ANOXIC BRAIN INJURY (HCC): Status: ACTIVE | Noted: 2024-01-01

## 2024-06-10 PROBLEM — R73.9 HYPERGLYCEMIA: Status: ACTIVE | Noted: 2024-01-01

## 2024-06-10 PROBLEM — R79.89 ELEVATED TROPONIN: Status: ACTIVE | Noted: 2024-01-01

## 2024-06-10 PROBLEM — N17.9 ACUTE KIDNEY INJURY (HCC): Status: ACTIVE | Noted: 2024-01-01

## 2024-06-10 PROBLEM — R74.01 TRANSAMINITIS: Status: ACTIVE | Noted: 2024-01-01

## 2024-06-10 PROBLEM — E87.20 LACTIC ACIDOSIS: Status: ACTIVE | Noted: 2024-01-01

## 2024-06-10 PROBLEM — G25.3 MYOCLONUS: Status: ACTIVE | Noted: 2024-01-01

## 2024-06-10 NOTE — PROCEDURES
EEG report    REFERRING PHYSICIAN: Efraín Marquez MD  PCP and phone number:  No primary care provider on file.  None    TECHNIQUE: 21 channels of EEG, 2 channels of EOG, and 1 channel of EKG were recorded utilizing the International 10/20 System. The recording was performed in a digitized monopolar referential format and playback was reformatted into various referential and bipolar montages utilizing appropriate filter settings. Automatic seizure and spike detection programs were utilized throughout the recording.  Video was recorded during the study    CLINICAL DATA:  Patient is sent for the evaluation of possible seizures and anoxic brain injury.    MEDICATION:  Continuous Medications:   norepinephrine Stopped (06/10/24 0347)    NxStage Pure Flow 401 CRRT/PIRRT fluid 5000mL 10 L/hr (06/10/24 1039)    propofol 25 mcg/kg/min (06/10/24 1500)     Scheduled Medications:  No current outpatient medications on file.  PRN Medications:    acetaminophen    ondansetron    metoclopramide    polyethylene glycol (PEG 3350)    sennosides    bisacodyl    glucose **OR** glucose **OR** glucose-vitamin C **OR** dextrose **OR** glucose **OR** glucose **OR** glucose-vitamin C    midazolam    LORazepam    atropine    hydrALAzine    LORazepam        ACTIVATION:  Hyperventilation: Not done  Photic stimulation: Not done  Sleep: No sleep architecture was seen.    BACKGROUND    Periodic bursts of high voltage mixtures of EEG activity (Bursts consist of sharp waves occurring every 10 seconds and lasting between 1-2 seconds. The bursts of EEG activity were  by generalized suppression of the EEG background of 10-20 µV. Jerks accompanied the bursts of discharges. The EEG remained unaltered by various stimuli.    IMPRESSION:    This is a markedly abnormal EEG even off sedation. The EEG reflects a burst-suppression pattern. This pattern is consistent with severe hypoxic encephalopathy, certain stages of general anesthesia, and  drug-induced coma.

## 2024-06-10 NOTE — CM/SW NOTE
06/10/24 1700   CM/SW Referral Data   Referral Source    Reason for Referral Discharge planning   Informant Sibling   Medical Hx   Does patient have an established PCP?   (OMER Lyles)   Patient Info   Advanced directives? No   Patient lives with Sibling   Patient Status Prior to Admission   Independent with ADLs and Mobility Yes   Discharge Needs   Anticipated D/C needs To be determined       Sarita lives with her sister who is per surrogate decision maker as she is the closest living relative.  Sarita lived with her sister prior to this admission.    CM/JUAN RAMON to follow and assist with dc planning.    Cathy BOXA BSN RN CRRN   RN Case Manager  [phone]

## 2024-06-10 NOTE — PROGRESS NOTES
[facility] Pharmacy Note:  Renal Dose Adjustment (CRRT)    Sarita Israel has been prescribed piperacillin/tazobactam (ZOSYN) every 12 hours.  Pharmacy has been asked to review medications for appropriateness for CRRT.    Renal Replacement fluid rate is 20-35 mL/kg/hr, or 2000 mL/hr.     Pharmacy will adjust the medications to the following doses:  piperacillin/tazobactam (ZOSYN) 3.375 g every 8 hours.  Pharmacy will also continue to assess the replacement rate and make any dose changes accordingly.    Thank you,  Jose Izquierdo, PharmD  6/10/2024 9:03 AM

## 2024-06-10 NOTE — H&P
[facility] Hospitalist H&P       CC:   Chief Complaint   Patient presents with    Cardiac Arrest        PCP: No primary care provider on file.    History of Present Illness: Ms. Israel is a 69 year old female with PMH sig for CKD stage 4-5, HTN, DM, who presented as cardiac arrest.  Patient intubated unable to provide hs, no family available, but per report patient wast not feeling well at home and family was brining her to the hospital when she became unresponsive, EMS called noted to be asystole, CPR initiated in the field, ROSC obtained in ED, intubated, on pressors, remained unresponsive.        PMH  No past medical history on file.     PSH  No past surgical history on file.     ALL:  No Known Allergies     Home Medications:  No outpatient medications have been marked as taking for the 6/9/24 encounter (Hospital Encounter).         Soc Hx  Social History     Tobacco Use    Smoking status: Not on file    Smokeless tobacco: Not on file   Substance Use Topics    Alcohol use: Not on file        Fam Hx  No family history on file.    Review of Systems  Comprehensive ROS reviewed and negative except for what's stated above.     OBJECTIVE:  /50   Pulse 59   Temp 96.6 °F (35.9 °C) (Temporal)   Resp 15   Ht 5' 6\" (1.676 m)   Wt 165 lb 5.5 oz (75 kg)   SpO2 100%   BMI 26.69 kg/m²   General:  Intermittent myoclonic jerks    Head:  Normocephalic, without obvious abnormality, atraumatic.   Eyes:  Sclera anicteric, No conjunctival pallor,    Nose: Nares normal. Septum midline. Mucosa normal. No drainage.   Throat: ET tube in place   Neck: Central line in place   Lungs:   Mechanical BS   Chest wall:  No tenderness or deformity.   Heart:  Regular rate and rhythm,     Abdomen:   Soft, non-tender. Bowel sounds normal.    Extremities: Extremities with edema    Skin: Skin color, texture, turgor normal. No rashes or lesions.    Neurologic: Myoclonic jerking noted, no purposeful movement      Diagnostic Data:     CBC/Chem  Recent Labs   Lab 06/09/24  2257 06/10/24  0309   WBC 12.9* 13.6*   HGB 9.9* 8.2*   MCV 91.7 87.7   .0 227.0       Recent Labs   Lab 06/09/24  2257 06/10/24  0309    142   K 5.5* 4.2    112   CO2 15.0* 16.0*   BUN 62* 66*   CREATSERUM 8.33* 7.97*   * 251*   CA 8.8 8.0*   MG 2.7*  --    PHOS  --  7.0*       Recent Labs   Lab 06/09/24  2257 06/10/24  030   *  --    *  --    ALB 3.3 2.9*       No results for input(s): \"TROP\" in the last 168 hours.     Radiology: No results found.      ASSESSMENT / PLAN:   Ms. Israel is a 69 year old female with PMH sig for CKD stage 4-5, HTN, DM, who presented as cardiac arrest.      Cardiac arrest   Hypotension/shock  - unclear how long patient was down Prior to CPR  - was on levo weaned off now  - poss related acidosis and hyperkalemia, previously declined HD  - echo pending  - Pulm, Cards, Renal consulted     Acute resp failure  - intubated  - pleural effusions noted on CKD  - zosyn  - pulm consulted, vent management per pulm    CORNEL  Metabolic acidosis   CKD stage 4-5  - Cre 8, K elevated on admit, pH 6.9  - likely from renal faliure and re-existing renal failure  - nephrology consulted    Leucoytosis   Bacteremia?  - BC, infectious work up pending  - IV zosyn empirically   -updated BC + for enterococcus, IV vanc ordered, repeat BC ordered    encephalopathy  Myoclonus  - poss anoxic related  - CT pending  - EEG pending  - Neurology consulted    HTN  - currently hypotensive    DM  - SSI and accuchecks    GOC:  - patient next of kin is her sister eusebio and her brother  - discussed with her sister at bedside, patient current condition, she states patient didn't want dialysis and was ok if she  of a cardiac arrest, but she is currently struggling with her decision on what further steps she should take.  - sister agreeable to DNR full treatment continue current measures await neurologic work up and proceed thereafter     FN:  -  IVF: per renal  - Diet: NPO    DVT Prophy:scd  Lines: central line place    Dispo: pending clinical course, DNR full treatment    Outpatient records or previous hospital records reviewed.     Further recommendations pending patient's clinical course.  [facility] hospitalist to continue to follow patient while in house    Patient and/or patient's family given opportunity to ask questions and note understanding and agreeing with therapeutic plan as outlined    Thank You,  Efraín Marquez MD    Hospitalist with [facility] Service number: [phone]

## 2024-06-10 NOTE — ED PROVIDER NOTES
Patient Seen in: [facility] Emergency Department      History     Chief Complaint   Patient presents with    Cardiac Arrest     Stated Complaint: arrest    Subjective:   HPI    Patient is a 69-year-old female with history of CKD and diabetes who arrives by EMS in cardiac arrest.  Paramedics state that family was about to bring the patient to the hospital because she was not feeling well recently.  Patient went into cardiac arrest and 911 was called.  Patient received several rounds of epinephrine and ACLS protocol initiated but patient remained in asystole per EMS.    Objective:   No pertinent past medical history.            No pertinent past surgical history.              No pertinent social history.            Review of Systems    Positive for stated complaint: arrest  Other systems are as noted in HPI.  Constitutional and vital signs reviewed.      All other systems reviewed and negative except as noted above.    Physical Exam     ED Triage Vitals   BP 06/09/24 2312 121/48   Pulse 06/09/24 2253 105   Resp 06/09/24 2253 25   Temp 06/09/24 2312 98 °F (36.7 °C)   Temp src 06/09/24 2312 Rectal   SpO2 06/09/24 2253 99 %   O2 Device 06/09/24 2345 Ventilator       Current Vitals:   Vital Signs  BP: (!) 73/42  Pulse: 71  Resp: (!) 29  Temp: 98 °F (36.7 °C)  Temp src: Rectal  MAP (mmHg): (!) 52    Oxygen Therapy  SpO2: 95 %  O2 Device: None (Room air)  FiO2 (%): 100 %            Physical Exam    GENERAL: unresponsive  HEENT: pupils fixed, tristian airway in pharynx  Neck: no jvd  CV: no palpable pulses  Resp: clear with bagging  Ab: soft, no distension  Extremities: 2+ edema BLE  Neuro: unresponsive, no spontaneous movements  SKIN: cool, dry, no rashes      ED Course     Labs Reviewed   LACTIC ACID, PLASMA - Abnormal; Notable for the following components:       Result Value    Lactic Acid 8.9 (*)     All other components within normal limits   ARTERIAL BLOOD GAS - Abnormal; Notable for the following components:     ABG pH 6.91 (*)     ABG pCO2 67 (*)     ABG HCO3 10.2 (*)     Blood Gas Base Excess -18.8 (*)     Lactic Acid (Blood Gas) 7.9 (*)     Oxygen Content 12.0 (*)     All other components within normal limits   COMP METABOLIC PANEL (14) - Abnormal; Notable for the following components:    Glucose 363 (*)     Potassium 5.5 (*)     CO2 15.0 (*)     BUN 62 (*)     Creatinine 8.33 (*)     BUN/CREA Ratio 7.4 (*)     Calculated Osmolality 318 (*)     eGFR-Cr 5 (*)      (*)      (*)     Bilirubin, Total <0.2 (*)     All other components within normal limits   MAGNESIUM - Abnormal; Notable for the following components:    Magnesium 2.7 (*)     All other components within normal limits   TROPONIN I HIGH SENSITIVITY - Abnormal; Notable for the following components:    Troponin I (High Sensitivity) 347 (*)     All other components within normal limits   LIPID PANEL - Abnormal; Notable for the following components:    Cholesterol, Total 225 (*)     HDL Cholesterol 33 (*)     Triglycerides 286 (*)     LDL Cholesterol 140 (*)     VLDL 54 (*)     Non HDL Chol 192 (*)     All other components within normal limits   POCT GLUCOSE - Abnormal; Notable for the following components:    POC Glucose  253 (*)     All other components within normal limits   CBC W/ DIFFERENTIAL - Abnormal; Notable for the following components:    WBC 12.9 (*)     RBC 3.51 (*)     HGB 9.9 (*)     HCT 32.2 (*)     MCHC 30.7 (*)     RDW-SD 51.8 (*)     RDW 15.3 (*)     All other components within normal limits   TSH W REFLEX TO FREE T4 - Normal   CK CREATINE KINASE (NOT CREATININE) - Normal   ETHYL ALCOHOL - Normal   URINALYSIS WITH CULTURE REFLEX   CBC WITH DIFFERENTIAL WITH PLATELET    Narrative:     The following orders were created for panel order CBC With Differential With Platelet.  Procedure                               Abnormality         Status                     ---------                               -----------         ------                      CBC W/ DIFFERENTIAL[[id number]]          Abnormal            Preliminary result           Please view results for these tests on the individual orders.   SCAN SLIDE   MD BLOOD SMEAR CONSULT   LACTIC ACID REFLEX POST POSTIVE   RAINBOW DRAW LAVENDER   RAINBOW DRAW LIGHT GREEN   RAINBOW DRAW BLUE   RAINBOW DRAW GOLD   BLOOD CULTURE   BLOOD CULTURE     EKG    Rate, intervals and axes as noted on EKG Report.  Rate: 72  Rhythm: Sinus Rhythm  Reading: ST depression; abnormal EKG           MDM         Medical Decision Making  ACLS initiated-pt given epi via IV  Pt with return of circulation and pulse  Pt did have brief bradycardia, given atropine    Rapid sequence intubation:   Indication for the procedure was cardiac arrest  The patient was orally endotracheally intubated under direct visualization with a 7.0 ETT.  There was good misting on the tube; breath sounds were auscultated equally bilaterally; good color change with [place] [employer] CO2 detector.  Chest X-ray shows ETT in good position.  The procedure was performed by myself.    Patient had brief episodes of seizure-like activity in the emergency department.  Patient given Ativan followed by Versed.  CT brain ordered.    Labs remarkable for high K, low bicarb/ph-pt given bicarb/insulin/glucose/calcium.   Zosyn ordered for lactic acidosis  minimal IVF given due to possible fluid overload on clinical appearance and on chest xray    Pt had brief drop in BP after versed administration. Started on levophed via peripheral IV  Attempt at central line in right femoral and right internal jugular unsuccessful by me as blood immediately coagulated and unable to pass wire.             Amount and/or Complexity of Data Reviewed  Independent Historian:      Details: Sister and brother at bedside.  They are presumed power of 's at this time as patient has no other direct relatives.  They state that patient had creatinine in the sevens several days ago and declined  dialysis.  Family however would like to go forward with treatment.  They understand patient's prognosis as severe.  Labs: ordered.  Radiology: ordered.     Details: Chest radiograph      IMPRESSION:  -Significantly rotated to the right  -The tip of the endotracheal tube is just above the lyric  and angled slightly to the right.  Could retract 1-2 cm as needed.  The tip and side-port of the nasogastric tube are both in the stomach.  -Moderate right pleural effusion; diffuse hazy opacities in the lungs likely reflect pulmonary edema and/or aspiration  -External pacing pads projecting over the upper abdomen and left lower chest.      Results faxed at 01:11 AM ET.  Can call [phone] (ext [phone]) if questions    Elton Ayala MD      CT head      IMPRESSION:  -No evidence of an acute intracranial abnormality  -A bandlike area of hypodensity in the right frontoparietal region appears artifactual (e.g sag im 33-34)  -Mild chronic small vessel ischemic changes in the white matter; mild volume loss  -Mild mucosal thickening in the paranasal sinuses    -Small, nonspecific lucency in the right frontal calvarium      Discussion of management or test interpretation with external provider(s): D/w dr Hayward for admission  D/w dr Pierre ICU  D/w dr brown cardiology  Nephrology notified Dr Ware    Risk  Drug therapy requiring intensive monitoring for toxicity.  Decision regarding hospitalization.    Critical Care  Total time providing critical care: 60 minutes        Disposition and Plan     Clinical Impression:  1. Cardiac arrest (HCC)    2. Lactic acidosis    3. Acute kidney injury (HCC)    4. Hyperkalemia    5. Hyperglycemia    6. Elevated troponin    7. Transaminitis    8. Pleural effusion         Disposition:  Admit  6/10/2024  1:06 am    Follow-up:  No follow-up provider specified.  We recommend that you schedule follow up care with a primary care provider within the next three months to obtain basic health  screening including reassessment of your blood pressure.      Medications Prescribed:  There are no discharge medications for this patient.                        Hospital Problems       Present on Admission             ICD-10-CM Noted POA    * (Principal) Cardiac arrest (HCC) I46.9 6/9/2024 Unknown

## 2024-06-10 NOTE — PLAN OF CARE
Restraints continued, new order on chart.  Pt is intubated for tube line safety. Pt does not follow cammands. Sinus rthm. scd's on.EEG done on and off sedation. pt is having seizures,  more with stimulation.  Seizure precations maintained. Bed padded sign above bed. Sinus r. Prn ativan given. Dr reardon enc me to increase sedation r/t seizures, keppra started. Creatnine 8.22 this am. Right neck hd cathetor placed. Crrt started today  . Accuchecks q 6 hrs. Left arm CHG removed. Mri to be done on 13th per Dr Reardon. Buttocks red. Bilat bunny boots place. Gentle turning q 2 hrs.  Og to low intermit suction gastric contents are coffee ground. Heparin on hold. Continuous crrt, 200 blood flow rate 0 removal 2 liter replacement fluid rate.   Problem: Safety Risk - Non-Violent Restraints  Goal: Patient will remain free from self-harm  Description: INTERVENTIONS:  - Apply the least restrictive restraint to prevent harm  - Notify patient and family of reasons restraints applied  - Assess for any contributing factors to confusion (electrolyte disturbances, delirium, medications)  - Discontinue any unnecessary medical devices as soon as possible  - Assess the patient's physical comfort, circulation, skin condition, hydration, nutrition and elimination needs   - Reorient and redirection as needed  - Assess for the need to continue restraints  Outcome: Progressing     Problem: Patient Centered Care  Goal: Patient preferences are identified and integrated in the patient's plan of care  Description: Interventions:  - What would you like us to know as we care for you?  - Provide timely, complete, and accurate information to patient/family  - Incorporate patient and family knowledge, values, beliefs, and cultural backgrounds into the planning and delivery of care  - Encourage patient/family to participate in care and decision-making at the level they choose  - Honor patient and family perspectives and choices  Outcome: Progressing      Problem: Diabetes/Glucose Control  Goal: Glucose maintained within prescribed range  Description: INTERVENTIONS:  - Monitor Blood Glucose as ordered  - Assess for signs and symptoms of hyperglycemia and hypoglycemia  - Administer ordered medications to maintain glucose within target range  - Assess barriers to adequate nutritional intake and initiate nutrition consult as needed  - Instruct patient on self management of diabetes  Outcome: Progressing     Problem: Patient/Family Goals  Goal: Patient/Family Long Term Goal  Description: Patient's Long Term Goal:    Interventions:  -  - See additional Care Plan goals for specific interventions  Outcome: Progressing  Goal: Patient/Family Short Term Goal  Description: Patient's Short Term Goal:    Interventions:     - See additional Care Plan goals for specific interventions  Outcome: Progressing     Problem: Delirium  Goal: Minimize duration of delirium  Description: Interventions:  - Encourage use of hearing aids, eye glasses  - Promote highest level of mobility daily  - Provide frequent reorientation  - Promote wakefulness i.e. lights on, blinds open  - Promote sleep, encourage patient's normal rest cycle i.e. lights off, TV off, minimize noise and interruptions  - Encourage family to assist in orientation and promotion of home routines  Outcome: Progressing     Problem: RESPIRATORY - ADULT  Goal: Achieves optimal ventilation and oxygenation  Description: INTERVENTIONS:  - Assess for changes in respiratory status  - Assess for changes in mentation and behavior  - Position to facilitate oxygenation and minimize respiratory effort  - Oxygen supplementation based on oxygen saturation or ABGs  - Provide Smoking Cessation handout, if applicable  - Encourage broncho-pulmonary hygiene including cough, deep breathe, Incentive Spirometry  - Assess the need for suctioning and perform as needed  - Assess and instruct to report SOB or any respiratory difficulty  - Respiratory  Therapy support as indicated  - Manage/alleviate anxiety  - Monitor for signs/symptoms of CO2 retention  Outcome: Progressing

## 2024-06-10 NOTE — PLAN OF CARE
Problem: RESPIRATORY - ADULT  Goal: Achieves optimal ventilation and oxygenation  Description: INTERVENTIONS:  - Assess for changes in respiratory status  - Assess for changes in mentation and behavior  - Position to facilitate oxygenation and minimize respiratory effort  - Oxygen supplementation based on oxygen saturation or ABGs  - Provide Smoking Cessation handout, if applicable  - Encourage broncho-pulmonary hygiene including cough, deep breathe, Incentive Spirometry  - Assess the need for suctioning and perform as needed  - Assess and instruct to report SOB or any respiratory difficulty  - Respiratory Therapy support as indicated  - Manage/alleviate anxiety  - Monitor for signs/symptoms of CO2 retention  Outcome: Progressing    Patient received on vent settings below:   06/10/24 0725   Vent Information   Interface Invasive   Vent Type AV   Vent plugged into main power? Yes   Vent    Vent Mode VC/AC   Settings   FiO2 (%) 60 %   Resp Rate (Set) 14   Vt (Set, mL) 500 mL   Waveform Decelerating ramp   PEEP/CPAP (cm H2O) 5 cm H20     FiO2 was decreased to 45% after ABG result 7.33/28/220/17. No SBT due to neurological status. ET was reposition at 21 cm at the lips.  RT continue to monitor.

## 2024-06-10 NOTE — PLAN OF CARE
Received pt from ED. Off propofol. On levo gtt. Called nocturnist, trialysis catheter placed. Confirmed by CXR.   OGT to LIS coffee ground seen MD aware.     Pt on CGM ; protocol followed and documented; Hospitalist aware.  Family visited ; updated . Requested to be called by MD when rounds. Will endorse to incoming RN.   Updated on call pulmonologist. No new orders.      Lactic 4.0 notified nocturnist.   Pt having jerky movements, frequent cough,   propofol restarted. 2mg versed and 2mg Ativan given .   No UO via simpson .

## 2024-06-10 NOTE — PROGRESS NOTES
[facility] Dosing Service      Initial Pharmacokinetic Consult for Vancomycin Dosing     Sarita Israel is a 69 year old female who is being initiated on vancomycin therapy for bacteremia.  Pharmacy has been asked to dose vancomycin by Dr. Marquez.  The initial treatment and monitoring approach will be non-AUC strategy.        Weight and Temperature:    Wt Readings from Last 1 Encounters:   06/10/24 75 kg (165 lb 5.5 oz)        Temp Readings from Last 1 Encounters:   06/10/24 97.1 °F (36.2 °C) (Temporal)      Labs:   Recent Labs   Lab 06/09/24  2257 06/10/24  0309 06/10/24  0810   CREATSERUM 8.33* 7.97* 8.22*      Estimated Creatinine Clearance: 5.9 mL/min (A) (based on SCr of 8.22 mg/dL (H)).     Recent Labs   Lab 06/09/24  2257 06/10/24  0309   WBC 12.9* 13.6*          The Pharmacokinetic Target is:    Trough/random 10-15 mg/L    Renal Dosing Considerations:    CRRT: CVVH at 2 L/hr     Assessment/Plan:   Initial/Loading dose: Will receive 2000 mg IV (25 mg/kg, capped at 2250 mg) x 1 loading dose.      Maintenance dose: Pharmacy will dose vancomycin at 1250 mg IV every 24 hours    Monitorin) Plan for vancomycin trough to be obtained in approximately 48 hours    2) Pharmacy will order SCr as clinically indicated to assess renal function.    3) Pharmacy will monitor for toxicity and efficacy, adjust vancomycin dose and/or frequency, and order vancomycin levels as appropriate per the Pharmacy and Therapeutics Committee approved protocol until discontinuation of the medication.       We appreciate the opportunity to assist in the care of this patient.     Jose Izquierdo, PharmD  6/10/2024  3:06 PM  [facility] Pharmacy Extension: [phone]

## 2024-06-10 NOTE — CONSULTS
[facility]  [address] [place] [address], [address]  [facility] [address]  [phone]          INPATIENT NEUROLOGY   INITIAL CONSULT NOTE       [facility]  part of [facility]    Report of Consultation    Sarita Israel Patient Status:  Inpatient     1955 MRN [id number]    Location [facility] 2W/SW Attending Efraín Marquez MD    Hosp Day # 0 PCP No primary care provider on file.      Date of Admission:  2024  Date of Consult:  6/10/2024    HPI:     Sarita Israel is a 69 year old woman with past medical history of CKD, hypertension, diabetes presented with feeling of unwell and while her family was been here into the hospital she became acutely unresponsive, found to be in asystole and ROSC was achieved in the emergency department.  EEG with burst suppression.    She is having stimulus-induced myoclonus.      CURRENT MEDICATIONS  No current outpatient medications on file.       OUTPATIENT MEDICATIONS  No current facility-administered medications on file prior to encounter.     No current outpatient medications on file prior to encounter.        MEDICAL HISTORY  No past medical history on file.    ?SURGICAL HISTORY  No past surgical history on file.    SOCIAL HISTORY  Social History     Socioeconomic History    Marital status: Single       FAMILY HISTORY  No family history on file.    ALLERGIES  No Known Allergies    ?REVIEW OF SYSTEMS:   Unable to obtain     ?PHYSICAL EXAM:     BP (!) 161/92   Pulse 68   Temp 96.5 °F (35.8 °C) (Temporal)   Resp (!) 33   Ht 66\"   Wt 165 lb 5.5 oz (75 kg)   SpO2 100%   BMI 26.69 kg/m²   General appearance: Well appearing, and in no acute distress  Skin: skin color, texture normal.  No rashes or lesions.    Head: Normocephalic, atraumatic.    Neurological exam:  Examined on Propofol  PERRL sluggish  No overt facial droop  Hyporeflexic  No movement of any extremity   Any stimulus provokes generalized myoclonus       LABS/DATA:    Lab  Results   Component Value Date    WBC 13.6 06/10/2024    HGB 8.2 06/10/2024    HCT 26.4 06/10/2024    .0 06/10/2024    CREATSERUM 8.22 06/10/2024    BUN 70 06/10/2024     06/10/2024    K 3.9 06/10/2024     06/10/2024    CO2 18.0 06/10/2024     06/10/2024    CA 8.1 06/10/2024    ALB 2.9 06/10/2024    ALKPHO 108 06/09/2024    BILT <0.2 06/09/2024    TP 6.1 06/09/2024     06/09/2024     06/09/2024    TSH 3.670 06/09/2024    MG 2.1 06/10/2024    PHOS 7.0 06/10/2024     06/09/2024    ETOH <3 06/09/2024       HGBA1C:    Lab Results   Component Value Date    A1C 6.9 (H) 06/09/2024     (H) 06/09/2024       Lab Results   Component Value Date     06/09/2024    HDL 33 06/09/2024    TRIG 286 06/09/2024    VLDL 54 06/09/2024           IMAGING:       CT BRAIN OR HEAD (30057)    Result Date: 6/10/2024  PROCEDURE: CT BRAIN OR HEAD (CPT=70450)  COMPARISON: None.  INDICATIONS: arrest  TECHNIQUE: CT images were obtained without contrast material.  Automated exposure control for dose reduction was used.  Dose information is transmitted to the ACR (American College of Radiology) NRDR (National Radiology Data Registry) which includes the Dose Index Registry.  Findings and impression:  No skull fracture  No hemorrhage or mass effect or edema  No hydrocephalus  Mild periventricular chronic ischemia  Mild mucosal thickening in the maxillary sinuses  Subcentimeter dystrophic calcification in the left temporal lobe  [facility] provided a prelim report for this exam. This final report has no significant discrepancies with the [facility] report. .    Dictated by (CST): Jaden Rivers MD on 6/10/2024 at 8:23 AM     Finalized by (CST): Jaden Rivers MD on 6/10/2024 at 8:25 AM                I PERSONALLY REVIEWED THESE IMAGES.     ASSESSMENT:  The patient is a 69 year old woman with past medical history of CKD, hypertension, diabetes presented with feeling of unwell and while her family  was been here into the hospital she became acutely unresponsive, found to be in asystole and ROSC was achieved in the emergency department.  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    CT brain was done with chronic microvascular ischemic changes    Anoxic brain injury complicated by myoclonus EEG done while on Propofol gtt is not reliable to assess for anoxic injury and needs to be repeated   - Follow up repeat EEG while holding Propofol   - Renally dose Keppra to control myoclonus at 500 mg once daily  -MRI brain after 72 hours      This note was prepared using Dragon Medical voice recognition dictation software and as a result, errors may occur. When identified, these errors have been corrected. While every attempt is made to correct errors during dictation, discrepancies may still exist    KARLEE Reardon DO   Staff Neurologist   6/10/2024  11:33 AM

## 2024-06-10 NOTE — PLAN OF CARE
Problem: Safety Risk - Non-Violent Restraints  Goal: Patient will remain free from self-harm  Description: INTERVENTIONS:  - Apply the least restrictive restraint to prevent harm  - Notify patient and family of reasons restraints applied  - Assess for any contributing factors to confusion (electrolyte disturbances, delirium, medications)  - Discontinue any unnecessary medical devices as soon as possible  - Assess the patient's physical comfort, circulation, skin condition, hydration, nutrition and elimination needs   - Reorient and redirection as needed  - Assess for the need to continue restraints  6/10/2024 0332 by Ryland Shah, RN  Outcome: Progressing  6/10/2024 0322 by Ryland Shah, RN  Outcome: Progressing

## 2024-06-10 NOTE — CONSULTS
[facility]  part of [facility]    Report of Consultation    Sarita Israel Patient Status:  Inpatient    1955 MRN [id number]   Location [facility] 2W/SW Attending Magaly Hayward MD   Hosp Day # 0 PCP No primary care provider on file.     Date of Admission:  2024  Date of Consult:  6/10/2024  Reason for Consultation:   CORNEL  Metabolic acidosis    History of Present Illness:   Patient is a 69 year old female with PMH of CKD stage 4-5 with baseline creatinine around 3.0 in 2023, seeing Dr. Beena Cardenas, patient was asked to start RRT in the past and had refused, she is admitted now s/p cardiac arrest. Currently she is intubated sedated now off of pressors.    History taken from POA sister rodney    Past Medical History  No past medical history on file.    Past Surgical History  No past surgical history on file.    Family History  No family history on file.    Social History  Social History     Socioeconomic History    Marital status: Single     Social Determinants of Health     Food Insecurity: Unknown (6/10/2024)    Food Insecurity     Food Insecurity: Patient unable to answer   Transportation Needs: Unknown (6/10/2024)    Transportation Needs     Lack of Transportation: Patient unable to answer   Housing Stability: Unknown (6/10/2024)    Housing Stability     Housing Instability: Patient unable to answer           Current Medications:  Current Facility-Administered Medications   Medication Dose Route Frequency    piperacillin-tazobactam (Zosyn) 3.375 g in dextrose 5% 100 mL IVPB-ADDV  3.375 g Intravenous Q12H    [Held by provider] heparin (Porcine) 5000 UNIT/ML injection 5,000 Units  5,000 Units Subcutaneous Q8H [facility]    acetaminophen (Tylenol Extra Strength) tab 500 mg  500 mg Oral Q4H PRN    ondansetron (Zofran) 4 MG/2ML injection 4 mg  4 mg Intravenous Q6H PRN    metoclopramide (Reglan) 5 mg/mL injection 5 mg  5 mg Intravenous Q8H PRN    polyethylene glycol (PEG 3350)  (Miralax) 17 g oral packet 17 g  17 g Oral Daily PRN    sennosides (Senokot) tab 17.2 mg  17.2 mg Oral Nightly PRN    bisacodyl (Dulcolax) 10 MG rectal suppository 10 mg  10 mg Rectal Daily PRN    glucose (Dex4) 15 GM/59ML oral liquid 15 g  15 g Oral Q15 Min PRN    Or    glucose (Glutose) 40% oral gel 15 g  15 g Oral Q15 Min PRN    Or    glucose-vitamin C (Dex-4) chewable tab 4 tablet  4 tablet Oral Q15 Min PRN    Or    dextrose 50% injection 50 mL  50 mL Intravenous Q15 Min PRN    Or    glucose (Dex4) 15 GM/59ML oral liquid 30 g  30 g Oral Q15 Min PRN    Or    glucose (Glutose) 40% oral gel 30 g  30 g Oral Q15 Min PRN    Or    glucose-vitamin C (Dex-4) chewable tab 8 tablet  8 tablet Oral Q15 Min PRN    insulin regular human (Novolin R, Humulin R) 100 UNIT/ML injection 1-7 Units  1-7 Units Subcutaneous 4 times per day    norepinephrine (Levophed) 4 mg/250mL infusion premix  0.5-30 mcg/min Intravenous Continuous    midazolam (Versed) 2 MG/2ML injection        LORazepam (Ativan) 2 mg/mL injection        atropine 0.1 MG/ML injection        pantoprazole (Protonix) 40 mg in sodium chloride 0.9% PF 10 mL IV push  40 mg Intravenous Q12H    insulin degludec 100 units/mL flextouch 8 Units  8 Units Subcutaneous Daily    propofol (Diprivan) 10 mg/mL infusion premix  5-50 mcg/kg/min Intravenous Continuous     No medications prior to admission.       Allergies  No Known Allergies    Review of Systems:   Review of systems not obtained due to patient factors.    Physical Exam:   Vital Signs:  Blood pressure 126/68, pulse 62, temperature 96.6 °F (35.9 °C), temperature source Temporal, resp. rate 15, height 5' 6\" (1.676 m), weight 165 lb 5.5 oz (75 kg), SpO2 100%.  General: Intubated and sedated  HEENT: ETT in place  Respiratory: Ventilatory breath sounds.  Cardiovascular: S1, S2.    Abdomen: Soft, nontender, nondistended.  Positive bowel sounds.  Ext: No LE edema  Neuro: intubated and sedated.      Results:     Laboratory  Data:  Lab Results   Component Value Date    WBC 13.6 (H) 06/10/2024    HGB 8.2 (L) 06/10/2024    HCT 26.4 (L) 06/10/2024    .0 06/10/2024    CREATSERUM 7.97 (H) 06/10/2024    BUN 66 (H) 06/10/2024     06/10/2024    K 4.2 06/10/2024     06/10/2024    CO2 16.0 (L) 06/10/2024     (H) 06/10/2024    CA 8.0 (L) 06/10/2024    ALB 2.9 (L) 06/10/2024    ALKPHO 108 06/09/2024    TP 6.1 06/09/2024     (H) 06/09/2024     (H) 06/09/2024    TSH 3.670 06/09/2024    MG 2.7 (H) 06/09/2024    PHOS 7.0 (H) 06/10/2024     06/09/2024    ETOH <3 06/09/2024         Imaging:  No results found.       Impression:   69 year old female with PMH of CKD stage 4-5 with baseline creatinine around 3.0 in 9/2023,she is admitted now s/p cardiac arrest:    CORNEL on CKD:  - baseline Creatinine around 3.0 in 9/2023  - primary nephrologist Dr. Beena Cardenas  - Discussed with sister rodney, patient had refused RRT in the past, but patient had not specified in case of emergency if she would want RRT. Sister wants to proceed with CRRT if indicated.  - Will start CRRT at this time for CORNEL on CKD and acidosis.  - I did discuss having a time limited trial given patients co morbidities and poor prognosis, will discuss with family daily  - Dialysis HD was already placed overnight.    Metabolic acidosis:  - From lactic acidosis and cardiac arrest  - CRRT as above.    Cardiac arrest:  - per primary and cardiology.    Anemia:  - from anemia of chronic disease and ABLA  - transfuse as per primary.    Shock:  - pressors as per primary.    Critical care time >35minutes.    Thank you for allowing me to participate in the care of your patient.    Jun Toney MD  [facility]  Nephrology

## 2024-06-10 NOTE — CONSULTS
Critical Care Consult     Assessment / Plan:  Acute respiratory failure  - continue full vent support. Vent mechanics are acceptable. Wean FiO2 as able. Repeat ABG pending  Cardiac arrest - possibly due to hyperK and acidemia  - normothermia protocol  - CRRT as below  - empiric Zosyn for now  - TTE  Shock  - off vasopressors  Encephalopathy - concern for anoxic encephalopathy  - supportive care  Myoclonic jerks - r/o seizure  - EEG  CORNEL on CKD, hyperkalemia  - CRRT  - nephrology following  Coffee ground output from OGT  - PPI  DM  - degludec  - SSI  FEN  - NPO  Ppx  - SCDs  - PPI  Dispo  - ICU    Critical care time: 75 minutes    Merlin Torres MD  Pulmonary and Critical Care Medicine      History of Present Illness:   Ms. Israel is a 69 year old with history of CKD, HTN and DM who we are asked to see after cardiac arrest. Noted to feel generally unwell by family and then became unresponsive. EMS was called and she was found to be in asystole. ROSC was eventually obtained when she arrived to the ED.     12 point ROS negative except per HPI.    PMH:  CKD  HTN  DM   No past surgical history on file.    No medications prior to admission.     No outpatient medications have been marked as taking for the 6/9/24 encounter (Hospital Encounter).      No Known Allergies   Social History     Socioeconomic History    Marital status: Single     Social Determinants of Health     Food Insecurity: Unknown (6/10/2024)    Food Insecurity     Food Insecurity: Patient unable to answer   Transportation Needs: Unknown (6/10/2024)    Transportation Needs     Lack of Transportation: Patient unable to answer   Housing Stability: Unknown (6/10/2024)    Housing Stability     Housing Instability: Patient unable to answer       No family history on file.      Exam:  Vitals:    06/10/24 0500 06/10/24 0600 06/10/24 0700 06/10/24 0800   BP: 108/61 126/68 100/50 138/64   BP Location: Right arm Right arm Right arm Right arm   Pulse: 62 62 59 62    Resp: 22 15 15 15   Temp:    96.5 °F (35.8 °C)   TempSrc:    Temporal   SpO2: 100% 100% 100% 100%   Weight:       Height:         General: intubated  Skin: no rash, ulcers or subcutaneous nodules  Eyes: anicteric sclerae, moist conjunctivae  Head, ears, nose, throat: atraumatic, oropharynx clear with moist mucous membranes  Neck: trachea midline with no thyromegaly  Heart: regular rate and rhythm, no murmurs / rubs / gallops  Lungs: clear bilaterally  Abdomen: soft, nontender, nondistended  Extremities: no edema or cyanosis  Psych: unresponsive  Neuro: intermittent myoclonic jerks    Labs:  Reviewed in EMR    Inpatient Medications:  Reviewed in EMR    Imaging:   Chest imaging reviewed

## 2024-06-10 NOTE — PROGRESS NOTES
On call Stuart 06/10/24  Patient admitted from ER s/p cardiac arrest with coffee ground in ,renal failure, severe acidosis  intubated on levo via peripheral iv.  Needs better access placed trialysis catheter.  Outcome extremely poor.    Indication: The patient required placement of a central venous catheter for emergent venous access for purposes of laboratory evaluation, IV fluid administration, and /or medication administration and possibly dialysis.   Area of entry was prepped and draped in sterile fashion.  Physical landmarks were identified and confirmed by ultrasound imaging.    Using the linear probe covered in a sterile sheath, a short axis view of the vein was obtained.  The r int jug vein was noted to be completely compressible and was identified as separate from any adjacent non compressible arterial structures. Under real-time guidance, the introducer needle was observed to tent the vein and then to puncture it.  There was positive return of venous blood and J point wire was then advanced in a modified Seldinger technique.  The introducer needle wire was then removed and #11 scalpel was then used to make a small incision to allow placement of the catheter. The J point wire was then removed and dilator was then removed and the catheter was advanced over the wire and secured in place with sutures.  Blood was readily drawn back through all ports and ports were easily flushed with saline or heparin flush.  Sterile dressing was then placed over the site.    Xray confirmed placement and no pneumothorax

## 2024-06-10 NOTE — PROCEDURES
EEG report    REFERRING PHYSICIAN: Efraín Marquez MD  PCP and phone number:  No primary care provider on file.  None    TECHNIQUE: 21 channels of EEG, 2 channels of EOG, and 1 channel of EKG were recorded utilizing the International 10/20 System. The recording was performed in a digitized monopolar referential format and playback was reformatted into various referential and bipolar montages utilizing appropriate filter settings. Automatic seizure and spike detection programs were utilized throughout the recording.  Video was recorded during the study    CLINICAL DATA:  Patient is sent for the evaluation of possible seizures and anoxic brain injury.    MEDICATION:  Continuous Medications:   norepinephrine Stopped (06/10/24 0347)    NxStage Pure Flow 401 CRRT/PIRRT fluid 5000mL      propofol 25 mcg/kg/min (06/10/24 1659)     Scheduled Medications:  No current outpatient medications on file.  PRN Medications:    acetaminophen    ondansetron    metoclopramide    polyethylene glycol (PEG 3350)    sennosides    bisacodyl    glucose **OR** glucose **OR** glucose-vitamin C **OR** dextrose **OR** glucose **OR** glucose **OR** glucose-vitamin C    midazolam    LORazepam    atropine    hydrALAzine        ACTIVATION:  Hyperventilation: Not done  Photic stimulation: Not done  Sleep: No sleep architecture was seen.    BACKGROUND    Periodic bursts of high voltage mixtures of EEG activity (Bursts consist of sharp waves occurring every 10 seconds and lasting between 1-2 seconds. The bursts of EEG activity were  by generalized suppression of the EEG background of 10-20 µV. Jerks accompanied the bursts of discharges. The EEG remained unaltered by various stimuli.    IMPRESSION:    This is a markedly abnormal EEG. The EEG reflects a burst-suppression pattern. This pattern is consistent with severe hypoxic encephalopathy, certain stages of general anesthesia, and drug-induced coma.

## 2024-06-10 NOTE — CONSULTS
[facility] CARDIOLOGY CONSULT      Sarita Israel is a 69 year old female who I assessed as follows:  Chief Complaint   Patient presents with    Cardiac Arrest          REASON FOR CONSULTATION:    \"cardiac arrest\"            ASSESSMENT/PLAN:     IMPRESSIONS:  Cardiac arrest, may very well be related to metabolic issues from worsening renal failure  Hypotension/shock, now off pressors  Elevated troponin  Acute resp failure, intubated  HTN  DM  Probable anoxic encephalopathy        PLAN:  Echo  ECG reviewed  Await decision from family on direction of care  Discussed with Dr Toney      Critical care time: 45 minutes      --------------------------------------------------------------------------------------------------------------------------------    HPI:         History of DM, HTN presents with not feeling well. Family was to bring patient to hospital but she arrested. 911 called. Asystole with paramedics; ACLS performed. In ER, given epi, the DARION with pulse. Then had bradycardia, given atropine. Seizure like activity in ER. Severely acidotic.    Was recently told that creat in 7s and she refused hemodialysis.    Currently pateint is intubated, sedated (no purposeful response when off sedation).             No current outpatient medications on file.      No past medical history on file.  No past surgical history on file.  No family history on file.   Social History:  Social History     Socioeconomic History    Marital status: Single     Social Determinants of Health     Food Insecurity: Unknown (6/10/2024)    Food Insecurity     Food Insecurity: Patient unable to answer   Transportation Needs: Unknown (6/10/2024)    Transportation Needs     Lack of Transportation: Patient unable to answer   Housing Stability: Unknown (6/10/2024)    Housing Stability     Housing Instability: Patient unable to answer          Medications:  Current Facility-Administered Medications   Medication Dose Route Frequency    midazolam  (Versed) 2 MG/2ML injection        LORazepam (Ativan) 2 mg/mL injection        atropine 0.1 MG/ML injection        piperacillin-tazobactam (Zosyn) 3.375 g in dextrose 5% 100 mL IVPB-ADDV  3.375 g Intravenous Q12H    [Held by provider] heparin (Porcine) 5000 UNIT/ML injection 5,000 Units  5,000 Units Subcutaneous Q8H [facility]    acetaminophen (Tylenol Extra Strength) tab 500 mg  500 mg Oral Q4H PRN    ondansetron (Zofran) 4 MG/2ML injection 4 mg  4 mg Intravenous Q6H PRN    metoclopramide (Reglan) 5 mg/mL injection 5 mg  5 mg Intravenous Q8H PRN    polyethylene glycol (PEG 3350) (Miralax) 17 g oral packet 17 g  17 g Oral Daily PRN    sennosides (Senokot) tab 17.2 mg  17.2 mg Oral Nightly PRN    bisacodyl (Dulcolax) 10 MG rectal suppository 10 mg  10 mg Rectal Daily PRN    glucose (Dex4) 15 GM/59ML oral liquid 15 g  15 g Oral Q15 Min PRN    Or    glucose (Glutose) 40% oral gel 15 g  15 g Oral Q15 Min PRN    Or    glucose-vitamin C (Dex-4) chewable tab 4 tablet  4 tablet Oral Q15 Min PRN    Or    dextrose 50% injection 50 mL  50 mL Intravenous Q15 Min PRN    Or    glucose (Dex4) 15 GM/59ML oral liquid 30 g  30 g Oral Q15 Min PRN    Or    glucose (Glutose) 40% oral gel 30 g  30 g Oral Q15 Min PRN    Or    glucose-vitamin C (Dex-4) chewable tab 8 tablet  8 tablet Oral Q15 Min PRN    insulin regular human (Novolin R, Humulin R) 100 UNIT/ML injection 1-7 Units  1-7 Units Subcutaneous 4 times per day    norepinephrine (Levophed) 4 mg/250mL infusion premix  0.5-30 mcg/min Intravenous Continuous    pantoprazole (Protonix) 40 mg in sodium chloride 0.9% PF 10 mL IV push  40 mg Intravenous Q12H    propofol (Diprivan) 10 mg/mL infusion premix  5-50 mcg/kg/min Intravenous Continuous           Allergies  No Known Allergies            REVIEW OF SYSTEMS:   Patient unable to answer          EXAM:         TELE: SR          /68 (BP Location: Right arm)   Pulse 62   Temp 96.6 °F (35.9 °C) (Temporal)   Resp 15   Ht 5' 6\" (1.676 m)    Wt 165 lb 5.5 oz (75 kg)   SpO2 100%   BMI 26.69 kg/m²   Temp (24hrs), Av.3 °F (36.3 °C), Min:96.6 °F (35.9 °C), Max:98 °F (36.7 °C)    Wt Readings from Last 3 Encounters:   06/10/24 165 lb 5.5 oz (75 kg)         Intake/Output Summary (Last 24 hours) at 6/10/2024 06  Last data filed at 6/10/2024 0603  Gross per 24 hour   Intake 235 ml   Output 0 ml   Net 235 ml         GENERAL:intubated, sedated  SKIN: no rashes  HEENT: atraumatic, normocephalic, throat without erythema  NECK: supple, no bruits  LUNGS: clear to auscultation  CARDIO: RRR without murmur or S3   GI: soft, nontender  EXTREMITIES: trace edema  NEUROLOGY: not alert  PSYCH: cooperative          LABORATORY DATA:               ECG:         ECHO:          Labs:  Recent Labs   Lab 06/09/24  2257 06/10/24  0309   * 251*   BUN 62* 66*   CREATSERUM 8.33* 7.97*   CA 8.8 8.0*    142   K 5.5* 4.2    112   CO2 15.0* 16.0*     No results for input(s): \"TROP\" in the last 168 hours.  Recent Labs   Lab 06/10/24  030   RBC 3.01*   HGB 8.2*   HCT 26.4*   MCV 87.7   MCH 27.2   MCHC 31.1   RDW 14.9   NEPRELIM 11.71*   WBC 13.6*   .0     Recent Labs   Lab 24   TROPHS 347*          Imaging:  No results found.         Lazarus Winn MD

## 2024-06-11 NOTE — DIETARY NOTE
ADULT NUTRITION INITIAL ASSESSMENT    Pt is at high nutrition risk.  Pt does not meet malnutrition criteria.      RECOMMENDATIONS TO MD:  Enteral nutrition orders placed but EN on hold per RN d/t bloody  NG output. Will monitor for EN start when deemed safe.      ADMITTING DIAGNOSIS:  Cardiac arrest (HCC) [I46.9]  Hyperkalemia [E87.5]  Lactic acidosis [E87.20]  Pleural effusion [J90]  Transaminitis [R74.01]  Hyperglycemia [R73.9]  Elevated troponin [R79.89]  Acute kidney injury (HCC) [N17.9]  PERTINENT PAST MEDICAL HISTORY: No past medical history on file.    PATIENT STATUS: Initial 06/11/24: Pt admit for Cardiac Arrest. And now most likely significant anoxic brain injury.  Pt is intubated and sedated on Propofol @ 19 ml/hr (~500 kcals from lipids). Pt started on CRRT for CORNEL on CKD and acidosis. 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  Pt assessed due to consult to initiate and manage tube feeds. Diet hx: unable to obtain. If continues full TX will obtain any data from sister Susy. Weight hx:  no past weights to assess for wt loss-reviewed full care everywhere. Physical exam:  well nourished, Edema noted.   Discussed at care rounds and per RN pt with bloody NG output thus holding on tube feed start. When able to start will utilize High protein polymeric formula for pt on CRRT-monitor if change to HD to adjust nutrition rx. Higher protein losses with CRRT. Ongoing GOC as poor prognosis noted/MD.     FOOD/NUTRITION RELATED HISTORY:  Appetite: Decreased per nutrition screen.   Intake: NPO x 2 days. PTA: decline per nutrition screen. No data available presently.   Intake Meeting Needs: NPO  Percent Meals Eaten (last 3 days)       None           Food Allergies: No Known Food Allergies (NKFA)  Cultural/Ethnic/Yarsani Preferences: Not Obtained    GASTROINTESTINAL: +BM 6/10 watery brown.   OG tube in place for EN access.   MEDICATIONS: reviewed    dextrose 10%      norepinephrine Stopped (06/10/24  9407)    NxStage Pure Flow 401 CRRT/PIRRT fluid 5000mL Stopped (24 0900)    propofol 35 mcg/kg/min (24 1207)        [Held by provider] heparin  5,000 Units Subcutaneous Q8H SHOAIB    insulin regular human  1-7 Units Subcutaneous 4 times per day    pantoprazole  40 mg Intravenous Q12H    insulin degludec  8 Units Subcutaneous Daily    piperacillin-tazobactam  3.375 g Intravenous Q8H    levETIRAcetam  500 mg Intravenous Q24H    vancomycin  15 mg/kg Intravenous Q24H     LABS: reviewed POC B, 118. CORNEL on CKD . Acidosis improving.   Recent Labs     06/10/24  0309 06/10/24  0810 06/10/24  1604 24  0340 24  0841   * 251* 156* 112* 100*   BUN 66* 70* 59* 32* 30*   CREATSERUM 7.97* 8.22* 6.66* 4.22* 3.53*   CA 8.0* 8.1* 8.0* 7.9* 8.0*   MG  --  2.1 2.1  --  1.8    142 142 139 140   K 4.2 3.9 4.3 4.2 4.1    113* 111 110 110   CO2 16.0* 18.0* 20.0* 22.0 24.0   PHOS 7.0*  --   --  3.7  --    OSMOCALC 322* 323* 314* 296* 296*       NUTRITION RELATED PHYSICAL FINDINGS:  - Nutrition Focused Physical Exam (NFPE): well nourished per exam and mild temporal deficit noted.   - Fluid Accumulation: +1 Bilateral Lower extremity and Feet see RN documentation for details  - Skin Integrity: intact see RN documentation for details    ANTHROPOMETRICS:  HT: 167.6 cm (5' 6\")  WT: 77.9 kg (171 lb 11.8 oz)   BMI: Body mass index is 27.72 kg/m².  BMI CLASSIFICATION: 25-29.9 kg/m2 - overweight  IBW: 130  lbs        131 % IBW  Usual Body Wt: N/A    WEIGHT HISTORY:  Patient Weight(s) for the past 336 hrs:   Weight   24 0247 77.9 kg (171 lb 11.8 oz)   06/10/24 0335 75 kg (165 lb 5.5 oz)   24 2322 90.7 kg (200 lb)     Wt Readings from Last 10 Encounters:   24 77.9 kg (171 lb 11.8 oz)     NUTRITION DIAGNOSIS/PROBLEM:   Inadequate oral intake related to Decreased ability to consume sufficient energy in the setting of intubation as evidenced by 0 nutrition intake, NPO.     NUTRITION  INTERVENTION:     NUTRITION PRESCRIPTION:   Estimated Nutrition needs: --dosing wt of 75 kg - wt taken on 6/10/24  Calories: 1367 -1500 calories/day (18-20 calories per kg Dosing wt) [place] state equation: 1367 kcals on 6/11.   Protein: 70-89 g protein/day (1.2-1.5  g protein/kg Ideal body wt (IBW)) (if remains on CRRT increase to 2 gm/kg or 118 g/day.   Fluid Needs: 25 ml/kg or 1875 ml. Adjust per clinical status. (Oliguria)   - EN rx once safe to proceed (via OG tube): Promote 25 ml/hr; advance 10 ml q 4 hrs to goal 40 ml/hr x ave 22 hr infusion time, ProSource 1 pack/day, FWF: 30 ml q 4 hrs (180 ml), total nutrition: 960 kcals (1460 total kcals including lipid/Propofol) 66 g protein, 829 ml h20, 1009 ml total h20 including FWF, 88% RDI's, 100% kcal goal and 94% protein goal.   - Diet: No orders of the defined types were placed in this encounter.  - Medical Food Supplements-NPO  - Vitamin and mineral supplements: none  - Feeding assistance: NPO  - Nutrition education: not appropriate at this time   - Coordination of nutrition care: collaboration with other providers and discussed in Care Rounds   - Discharge and transfer of nutrition care to new setting or provider: monitor plans. Lives with her sister.     MONITOR AND EVALUATE/NUTRITION GOALS:  - Food and Nutrient Intake:      Monitor: for PO initiation  - Food and Nutrient Administration:      Monitor: tolerance to enteral nutrition, adequacy of enteral nutrition, for enteral nutrition adjustment, and propofol rate and CRRT vs HD.   - Anthropometric Measurement:    Monitor weight  - Nutrition Goals:      allow wt loss due to fluid losses, EN + non-nutritive kcal >80% energy needs, labs within acceptable limits, and euglycemia    DIETITIAN FOLLOW UP: RD to follow and monitor nutrition status    Susana Juarez RD, LDN, [facility] ([id number])

## 2024-06-11 NOTE — PROGRESS NOTES
[facility] CARDIOLOGY Progress Note      Sarita Israel is a 69 year old female who I assessed as follows:  Chief Complaint   Patient presents with    Cardiac Arrest          REASON FOR CONSULTATION:    \"cardiac arrest\"            ASSESSMENT/PLAN:     IMPRESSIONS:  Cardiac arrest, may very well be related to metabolic issues from worsening renal failure and sepsis  Hypotension/shock, now off pressors  Elevated troponin  Acute resp failure, intubated  HTN  DM  anoxic encephalopathy. EEG with poor prognosis  Sepsis/Enterococcus faecium bacteremia   CORNEL on CKD        PLAN:  Echo reviewed  CRRT overnight  Await to see if neurologic recovery  Poor prognosis        Critical care time: 35 minutes        Subjective:    Intubated.     --------------------------------------------------------------------------------------------------------------------------------    HPI:         History of DM, HTN presents with not feeling well. Family was to bring patient to hospital but she arrested. 911 called. Asystole with paramedics; ACLS performed. In ER, given epi, the DARION with pulse. Then had bradycardia, given atropine. Seizure like activity in ER. Severely acidotic.    Was recently told that creat in 7s and she refused hemodialysis.    Currently pateint is intubated, sedated (no purposeful response when off sedation).             No current outpatient medications on file.      No past medical history on file.  No past surgical history on file.  No family history on file.   Social History:  Social History     Socioeconomic History    Marital status: Single     Social Determinants of Health     Food Insecurity: Unknown (6/10/2024)    Food Insecurity     Food Insecurity: Patient unable to answer   Transportation Needs: Unknown (6/10/2024)    Transportation Needs     Lack of Transportation: Patient unable to answer   Housing Stability: Unknown (6/10/2024)    Housing Stability     Housing Instability: Patient unable to answer           Medications:  Current Facility-Administered Medications   Medication Dose Route Frequency    dextrose 10% infusion (TPN no rate)   Intravenous Continuous PRN    pancrelipase (Lip-Prot-Amyl) (Zenpep) DR particles cap 10,000 Units  10,000 Units Per G Tube PRN    And    sodium bicarbonate tab 325 mg  325 mg Oral PRN    [Held by provider] heparin (Porcine) 5000 UNIT/ML injection 5,000 Units  5,000 Units Subcutaneous Q8H SHOAIB    acetaminophen (Tylenol Extra Strength) tab 500 mg  500 mg Oral Q4H PRN    ondansetron (Zofran) 4 MG/2ML injection 4 mg  4 mg Intravenous Q6H PRN    metoclopramide (Reglan) 5 mg/mL injection 5 mg  5 mg Intravenous Q8H PRN    polyethylene glycol (PEG 3350) (Miralax) 17 g oral packet 17 g  17 g Oral Daily PRN    sennosides (Senokot) tab 17.2 mg  17.2 mg Oral Nightly PRN    bisacodyl (Dulcolax) 10 MG rectal suppository 10 mg  10 mg Rectal Daily PRN    glucose (Dex4) 15 GM/59ML oral liquid 15 g  15 g Oral Q15 Min PRN    Or    glucose (Glutose) 40% oral gel 15 g  15 g Oral Q15 Min PRN    Or    glucose-vitamin C (Dex-4) chewable tab 4 tablet  4 tablet Oral Q15 Min PRN    Or    dextrose 50% injection 50 mL  50 mL Intravenous Q15 Min PRN    Or    glucose (Dex4) 15 GM/59ML oral liquid 30 g  30 g Oral Q15 Min PRN    Or    glucose (Glutose) 40% oral gel 30 g  30 g Oral Q15 Min PRN    Or    glucose-vitamin C (Dex-4) chewable tab 8 tablet  8 tablet Oral Q15 Min PRN    insulin regular human (Novolin R, Humulin R) 100 UNIT/ML injection 1-7 Units  1-7 Units Subcutaneous 4 times per day    norepinephrine (Levophed) 4 mg/250mL infusion premix  0.5-30 mcg/min Intravenous Continuous    pantoprazole (Protonix) 40 mg in sodium chloride 0.9% PF 10 mL IV push  40 mg Intravenous Q12H    insulin degludec 100 units/mL flextouch 8 Units  8 Units Subcutaneous Daily    NxStage Pure Flow 401 CRRT/PIRRT fluid 5000mL  2 L/hr CRRT Continuous    piperacillin-tazobactam (Zosyn) 3.375 g in dextrose 5% 100 mL IVPB-ADDV  3.375 g  Intravenous Q8H    hydrALAzine (Apresoline) 20 mg/mL injection 10 mg  10 mg Intravenous Q6H PRN    LORazepam (Ativan) 2 mg/mL injection 2 mg  2 mg Intravenous Q30 Min PRN    levETIRAcetam (Keppra) 500 mg/5mL injection 500 mg  500 mg Intravenous Q24H    vancomycin (Vancocin) 1.25 g in sodium chloride 0.9% 250mL IVPB premix  15 mg/kg Intravenous Q24H    propofol (Diprivan) 10 mg/mL infusion premix  5-50 mcg/kg/min Intravenous Continuous           Allergies  No Known Allergies            REVIEW OF SYSTEMS:   Patient unable to answer          EXAM:         TELE: SR          /52 (BP Location: Right arm)   Pulse 69   Temp 96.9 °F (36.1 °C) (Temporal)   Resp 14   Ht 5' 6\" (1.676 m)   Wt 171 lb 11.8 oz (77.9 kg)   SpO2 100%   BMI 27.72 kg/m²   Temp (24hrs), Av.9 °F (36.1 °C), Min:95.4 °F (35.2 °C), Max:98 °F (36.7 °C)    Wt Readings from Last 3 Encounters:   24 171 lb 11.8 oz (77.9 kg)         Intake/Output Summary (Last 24 hours) at 2024 1356  Last data filed at 2024 1100  Gross per 24 hour   Intake 1259.2 ml   Output 1156 ml   Net 103.2 ml         GENERAL:intubated  SKIN: no rashes  HEENT: atraumatic, normocephalic, throat without erythema  NECK: supple, no bruits  LUNGS: clear to auscultation  CARDIO: RRR without murmur or S3   GI: soft, nontender  EXTREMITIES: no edema  NEUROLOGY: not alert  PSYCH: cooperative          LABORATORY DATA:               ECG:         ECHO:          Labs:  Recent Labs   Lab 06/10/24  1604 24  0340 24  0841   * 112* 100*   BUN 59* 32* 30*   CREATSERUM 6.66* 4.22* 3.53*   CA 8.0* 7.9* 8.0*    139 140   K 4.3 4.2 4.1    110 110   CO2 20.0* 22.0 24.0     No results for input(s): \"TROP\" in the last 168 hours.  Recent Labs   Lab 24  0340   RBC 2.56*   HGB 7.0*   HCT 21.9*   MCV 85.5   MCH 27.3   MCHC 32.0   RDW 15.3*   NEPRELIM 10.63*   WBC 12.5*   PLT 85.0*     Recent Labs   Lab 24  2257 24  0340   TROPHS 347* 609*      --        Imaging:  CT BRAIN OR HEAD (83268)    Result Date: 6/11/2024  PROCEDURE: CT BRAIN OR HEAD (CPT=70450)  COMPARISON: [facility], CT BRAIN OR HEAD (CPT=70450), 6/10/2024, 0:25 AM.  INDICATIONS: anoxia  TECHNIQUE: CT images were obtained without contrast material.  Automated exposure control for dose reduction was used.  Dose information is transmitted to the ACR (American College of Radiology) NRDR (National Radiology Data Registry) which includes the Dose Index Registry.  Findings and impression:  No CT evidence of hypoxic brain injury  No hemorrhage or mass effect or edema  Normal midline ventricles with stable mild chronic ischemia in the periventricular and deep white matter    Dictated by (CST): Jaden Rivers MD on 6/11/2024 at 10:07 AM     Finalized by (CST): Jaden Rivers MD on 6/11/2024 at 10:09 AM          XR CHEST AP PORTABLE  (CPT=71045)    Result Date: 6/10/2024  CONCLUSION:  1. Cardiomegaly.  Tortuous aorta. 2. Bilateral mixed alveolar and interstitial multifocal airspace opacification confluent lung consolidation more pronounced in the right perihilar region with slight improvement at the apex. 3. Persistent left basilar atelectatic changes and/or lung consolidation.  4. Moderate right-sided effusion has increased    Dictated by (CST): Tex Shah MD on 6/10/2024 at 10:23 AM     Finalized by (CST): Tex Shah MD on 6/10/2024 at 10:28 AM          XR CHEST AP PORTABLE  (CPT=71045)    Result Date: 6/10/2024  PROCEDURE: XR CHEST AP PORTABLE  (CPT=71045) TIME: 226  COMPARISON: [facility], XR CHEST AP PORTABLE (CPT=71045), 6/09/2024, 11:15 PM.  INDICATIONS: Verify right side central line placement.  TECHNIQUE:   Single view.   Findings and impression:  Right venous catheter in the lower SVC.  No pneumothorax  ETT is 1.9 cm above the lyric  Enteric tube into the stomach  Stable heart with mild perihilar opacities right greater than left and  bilateral layering effusions  [facility] provided a prelim report for this exam. This final report has no significant discrepancies with the [facility] report.  Dictated by (CST): Jaden Rivers MD on 6/10/2024 at 9:05 AM     Finalized by (CST): Jaden Rivers MD on 6/10/2024 at 9:06 AM          CT BRAIN OR HEAD ([id number])    Result Date: 6/10/2024  PROCEDURE: CT BRAIN OR HEAD (CPT=70450)  COMPARISON: None.  INDICATIONS: arrest  TECHNIQUE: CT images were obtained without contrast material.  Automated exposure control for dose reduction was used.  Dose information is transmitted to the ACR (American College of Radiology) NRDR (National Radiology Data Registry) which includes the Dose Index Registry.  Findings and impression:  No skull fracture  No hemorrhage or mass effect or edema  No hydrocephalus  Mild periventricular chronic ischemia  Mild mucosal thickening in the maxillary sinuses  Subcentimeter dystrophic calcification in the left temporal lobe  [facility] provided a prelim report for this exam. This final report has no significant discrepancies with the [facility] report. .    Dictated by (CST): Jaden Rivers MD on 6/10/2024 at 8:23 AM     Finalized by (CST): Jaden Rivers MD on 6/10/2024 at 8:25 AM          XR CHEST AP PORTABLE  (CPT=71045)    Result Date: 6/10/2024  PROCEDURE: XR CHEST AP PORTABLE  (CPT=71045) TIME: 2315  COMPARISON: [facility], XR CHEST AP PORTABLE (CPT=71045), 6/10/2024, 2:26 AM.  INDICATIONS: Full arrest verification of NG and OG tube placement.  TECHNIQUE:   Single view.   Findings and impression:  Rightward rotation is present  Low lung ETT 0.8 cm above the lyric .  This could be retracted 1-2 centimeter  Heart is magnified by technique  There is an enteric tube into the stomach  Haziness throughout the bilateral lungs likely due to layering pleural effusion right greater than left  No pneumothorax  [facility] provided a prelim report for this exam. This final  report has no significant discrepancies with the [facility] report.      Dictated by (CST): Jaden Rivers MD on 6/10/2024 at 8:06 AM     Finalized by (CST): Jaden Rivers MD on 6/10/2024 at 8:08 AM                Lazarus Winn MD

## 2024-06-11 NOTE — PROGRESS NOTES
06/11/24 0249   Vent Information   Vent Mode VC/AC   Settings   FiO2 (%) 30 %   Resp Rate (Set) 14   Vt (Set, mL) 550 mL   Waveform Decelerating ramp   PEEP/CPAP (cm H2O) 5 cm H20     Received patient intubated/mechanically ventilated on full support. Suction provided as needed. RT will continue to monitor

## 2024-06-11 NOTE — PROGRESS NOTES
[facility]  part of [facility]    Progress Note    Sarita Israel Patient Status:  Inpatient    1955 MRN [id number]   Location [facility] 2W/SW Attending Efraín Marquez MD   Hosp Day # 1 PCP No primary care provider on file.       Subjective:     Tolerating CRRT overnight.    Objective:   Vital Signs:  Blood pressure 140/51, pulse 66, temperature (!) 95.4 °F (35.2 °C), temperature source Temporal, resp. rate 14, height 5' 6\" (1.676 m), weight 171 lb 11.8 oz (77.9 kg), SpO2 100%.  General: Intubated and non responsive  HEENT: ETT in place  Respiratory: Ventilatory breath sounds.  Cardiovascular: S1, S2.    Abdomen: Soft, nontender, nondistended.  Positive bowel sounds.  Ext: No LE edema  Neuro: intubated and non responsive    Results:     Lab Results   Component Value Date    WBC 12.5 (H) 2024    HGB 7.0 (L) 2024    HCT 21.9 (L) 2024    PLT 85.0 (L) 2024    CREATSERUM 4.22 (H) 2024    BUN 32 (H) 2024     2024    K 4.2 2024     2024    CO2 22.0 2024     (H) 2024    CA 7.9 (L) 2024    ALB 2.7 (L) 2024    ALKPHO 108 2024    BILT <0.2 (L) 2024    TP 6.1 2024     (H) 2024     (H) 2024    TSH 3.670 2024    MG 2.1 06/10/2024    PHOS 3.7 2024     2024    B12 1,320 (H) 06/10/2024    ETOH <3 2024       XR CHEST AP PORTABLE  (CPT=71045)    Result Date: 6/10/2024  CONCLUSION:  1. Cardiomegaly.  Tortuous aorta. 2. Bilateral mixed alveolar and interstitial multifocal airspace opacification confluent lung consolidation more pronounced in the right perihilar region with slight improvement at the apex. 3. Persistent left basilar atelectatic changes and/or lung consolidation.  4. Moderate right-sided effusion has increased    Dictated by (CST): Tex Shah MD on 6/10/2024 at 10:23 AM     Finalized by (CST): Tex Shah MD  on 6/10/2024 at 10:28 AM          XR CHEST AP PORTABLE  (CPT=71045)    Result Date: 6/10/2024  PROCEDURE: XR CHEST AP PORTABLE  (CPT=71045) TIME: 226  COMPARISON: [facility], XR CHEST AP PORTABLE (CPT=71045), 6/09/2024, 11:15 PM.  INDICATIONS: Verify right side central line placement.  TECHNIQUE:   Single view.   Findings and impression:  Right venous catheter in the lower SVC.  No pneumothorax  ETT is 1.9 cm above the lyric  Enteric tube into the stomach  Stable heart with mild perihilar opacities right greater than left and bilateral layering effusions  [facility] provided a prelim report for this exam. This final report has no significant discrepancies with the [facility] report.  Dictated by (CST): Jaden Rivers MD on 6/10/2024 at 9:05 AM     Finalized by (CST): Jaden Rivers MD on 6/10/2024 at 9:06 AM          CT BRAIN OR HEAD ([id number])    Result Date: 6/10/2024  PROCEDURE: CT BRAIN OR HEAD (CPT=70450)  COMPARISON: None.  INDICATIONS: arrest  TECHNIQUE: CT images were obtained without contrast material.  Automated exposure control for dose reduction was used.  Dose information is transmitted to the ACR (American College of Radiology) NRDR (National Radiology Data Registry) which includes the Dose Index Registry.  Findings and impression:  No skull fracture  No hemorrhage or mass effect or edema  No hydrocephalus  Mild periventricular chronic ischemia  Mild mucosal thickening in the maxillary sinuses  Subcentimeter dystrophic calcification in the left temporal lobe  [facility] provided a prelim report for this exam. This final report has no significant discrepancies with the [facility] report. .    Dictated by (CST): Jaden Rivers MD on 6/10/2024 at 8:23 AM     Finalized by (CST): Jaden Rivers MD on 6/10/2024 at 8:25 AM          XR CHEST AP PORTABLE  (CPT=71045)    Result Date: 6/10/2024  PROCEDURE: XR CHEST AP PORTABLE  (CPT=71045) TIME: 2315  COMPARISON: [facility], XR CHEST  AP PORTABLE (CPT=71045), 6/10/2024, 2:26 AM.  INDICATIONS: Full arrest verification of NG and OG tube placement.  TECHNIQUE:   Single view.   Findings and impression:  Rightward rotation is present  Low lung ETT 0.8 cm above the lyric .  This could be retracted 1-2 centimeter  Heart is magnified by technique  There is an enteric tube into the stomach  Haziness throughout the bilateral lungs likely due to layering pleural effusion right greater than left  No pneumothorax  [facility] provided a prelim report for this exam. This final report has no significant discrepancies with the [facility] report.      Dictated by (CST): Jaden Rivers MD on 6/10/2024 at 8:06 AM     Finalized by (CST): Jaden Rivers MD on 6/10/2024 at 8:08 AM         EKG 12 Lead    Result Date: 6/9/2024  Sinus rhythm with sinus arrhythmia with short CT with Fusion complexes Possible Left atrial enlargement Left axis deviation Incomplete right bundle branch block Minimal voltage criteria for LVH, may be normal variant ( R in aVL ) ST & T wave abnormality, consider inferior ischemia ST & T wave abnormality, consider anterolateral ischemia Abnormal ECG No previous ECGs found in [place]       Assessment and Plan:   69 year old female with PMH of CKD stage 4-5 with baseline creatinine around 3.0 in 9/2023,she is admitted now s/p cardiac arrest:     CORNEL on CKD:  - baseline Creatinine around 3.0 in 9/2023  - primary nephrologist Dr. Beena Cardenas  - Discussed with sister rodney, patient had refused RRT in the past, but patient had not specified in case of emergency if she would want RRT. Sister wants to proceed with CRRT if indicated.  - Resume CRRT at this time for CORNEL on CKD and acidosis.  - I did discuss having a time limited trial given patients co morbidities and poor prognosis  - Dialysis HD was already placed overnight.     Metabolic acidosis:  - From lactic acidosis and cardiac arrest  - CRRT as above.     Cardiac arrest:  - per primary and  cardiology.     Anemia:  - from anemia of chronic disease and ABLA  - transfuse as per primary.     Shock:  - off vasopressors at this time.    AMS:  - Per neurology.     Critical care time >35minutes.     Thank you for allowing me to participate in the care of your patient.     Jun Toney MD  [facility]  Nephrology

## 2024-06-11 NOTE — PROGRESS NOTES
Critical Care Progress Note     Assessment / Plan:  Acute respiratory failure  - continue full vent support  Cardiac arrest - likely due to hyperK and acidemia in the setting of sepsis  - normothermia protocol  - RRT as below  - antibiotics as below  Shock  - off vasopressors  - antibiotics  Sepsis - blood culture with Enterococcus faecium  - IV vancomycin  - Zosyn  Encephalopathy - concern for anoxic encephalopathy  - repeat CT brain this morning  - per neurology  CORNEL on CKD, hyperkalemia  - RRT per nephrology  Coffee ground output from OGT  - PPI  Anemia  - transfuse to keep hgb >7  DM  - degludec  - SSI  FEN  - start TFs  Ppx  - SCDs  - PPI  Dispo  - DNAR/full  - poor prognosis discussed with family    Critical care time: 35 minutes      Subjective:  CRRT clotted this morning. No other events.    Objective:  Vitals:    06/11/24 0500 06/11/24 0600 06/11/24 0700 06/11/24 0800   BP: 124/54 133/54 130/52 140/51   BP Location: Right arm Right arm Right arm Right arm   Pulse: 59 60 61 66   Resp: 14 14 14 14   Temp:       TempSrc:       SpO2: 98% 99% 99% 100%   Weight:       Height:         Physical Exam:  General: intubated  Skin: no rash, ulcers or subcutaneous nodules  Eyes: anicteric sclerae, moist conjunctivae  Head, ears, nose, throat: atraumatic, oropharynx clear with moist mucous membranes  Neck: trachea midline with no thyromegaly  Heart: regular rate and rhythm, no murmurs / rubs / gallops  Lungs: clear bilaterally  Abdomen: soft, nontender, nondistended   Extremities: no edema or cyanosis  Psych: unresponsive    Medications:  Reviewed in EMR    Lab Data:  Reviewed in EMR    Imaging:  I independently visualized all relevant chest imaging in PACS and agree with radiology interpretation except where noted.

## 2024-06-11 NOTE — PLAN OF CARE
Problem: Safety Risk - Non-Violent Restraints  Goal: Patient will remain free from self-harm  Description: INTERVENTIONS:  - Apply the least restrictive restraint to prevent harm  - Notify patient and family of reasons restraints applied  - Assess for any contributing factors to confusion (electrolyte disturbances, delirium, medications)  - Discontinue any unnecessary medical devices as soon as possible  - Assess the patient's physical comfort, circulation, skin condition, hydration, nutrition and elimination needs   - Reorient and redirection as needed  - Assess for the need to continue restraints  Outcome: Completed     Problem: Patient Centered Care  Goal: Patient preferences are identified and integrated in the patient's plan of care  Description: Interventions:  - What would you like us to know as we care for you?   - Provide timely, complete, and accurate information to patient/family  - Incorporate patient and family knowledge, values, beliefs, and cultural backgrounds into the planning and delivery of care  - Encourage patient/family to participate in care and decision-making at the level they choose  - Honor patient and family perspectives and choices  Outcome: Progressing     Problem: Diabetes/Glucose Control  Goal: Glucose maintained within prescribed range  Description: INTERVENTIONS:  - Monitor Blood Glucose as ordered  - Assess for signs and symptoms of hyperglycemia and hypoglycemia  - Administer ordered medications to maintain glucose within target range  - Assess barriers to adequate nutritional intake and initiate nutrition consult as needed  - Instruct patient on self management of diabetes  Outcome: Progressing     Problem: Patient/Family Goals  Goal: Patient/Family Long Term Goal  Description: Patient's Long Term Goal:     Interventions:  -   - See additional Care Plan goals for specific interventions  Outcome: Progressing  Goal: Patient/Family Short Term Goal  Description: Patient's Short  Term Goal:     Interventions:   -   - See additional Care Plan goals for specific interventions  Outcome: Progressing     Problem: Delirium  Goal: Minimize duration of delirium  Description: Interventions:  - Encourage use of hearing aids, eye glasses  - Promote highest level of mobility daily  - Provide frequent reorientation  - Promote wakefulness i.e. lights on, blinds open  - Promote sleep, encourage patient's normal rest cycle i.e. lights off, TV off, minimize noise and interruptions  - Encourage family to assist in orientation and promotion of home routines  Outcome: Progressing     Problem: RESPIRATORY - ADULT  Goal: Achieves optimal ventilation and oxygenation  Description: INTERVENTIONS:  - Assess for changes in respiratory status  - Assess for changes in mentation and behavior  - Position to facilitate oxygenation and minimize respiratory effort  - Oxygen supplementation based on oxygen saturation or ABGs  - Provide Smoking Cessation handout, if applicable  - Encourage broncho-pulmonary hygiene including cough, deep breathe, Incentive Spirometry  - Assess the need for suctioning and perform as needed  - Assess and instruct to report SOB or any respiratory difficulty  - Respiratory Therapy support as indicated  - Manage/alleviate anxiety  - Monitor for signs/symptoms of CO2 retention  Outcome: Progressing

## 2024-06-11 NOTE — PROGRESS NOTES
Patient was identified and prepped in sterile fashion, time out completed. Existing dialysis catheter was used to pass amplatz wire down and new catheter inserted over wire. 2% lidocaine 2 ml was administered by subcutaneous infiltration. Patient was monitored throughout the procedure and tolerated procedure well, report given to Medina HAWKINS. Patient stable at this time. Catheter placement needs to verified by X ray before use.

## 2024-06-11 NOTE — PLAN OF CARE
HD catheter exchanged at bedside. CRRT continued. PRN ativan given for possible seizure like activity.     Problem: Patient Centered Care  Goal: Patient preferences are identified and integrated in the patient's plan of care  Description: Interventions:  - What would you like us to know as we care for you?   - Provide timely, complete, and accurate information to patient/family  - Incorporate patient and family knowledge, values, beliefs, and cultural backgrounds into the planning and delivery of care  - Encourage patient/family to participate in care and decision-making at the level they choose  - Honor patient and family perspectives and choices  Outcome: Progressing     Problem: Diabetes/Glucose Control  Goal: Glucose maintained within prescribed range  Description: INTERVENTIONS:  - Monitor Blood Glucose as ordered  - Assess for signs and symptoms of hyperglycemia and hypoglycemia  - Administer ordered medications to maintain glucose within target range  - Assess barriers to adequate nutritional intake and initiate nutrition consult as needed  - Instruct patient on self management of diabetes  Outcome: Progressing     Problem: Patient/Family Goals  Goal: Patient/Family Long Term Goal  Description: Patient's Long Term Goal:     Interventions:  -   - See additional Care Plan goals for specific interventions  Outcome: Progressing  Goal: Patient/Family Short Term Goal  Description: Patient's Short Term Goal:     Interventions:   -   - See additional Care Plan goals for specific interventions  Outcome: Progressing     Problem: Delirium  Goal: Minimize duration of delirium  Description: Interventions:  - Encourage use of hearing aids, eye glasses  - Promote highest level of mobility daily  - Provide frequent reorientation  - Promote wakefulness i.e. lights on, blinds open  - Promote sleep, encourage patient's normal rest cycle i.e. lights off, TV off, minimize noise and interruptions  - Encourage family to assist in  orientation and promotion of home routines  Outcome: Progressing     Problem: RESPIRATORY - ADULT  Goal: Achieves optimal ventilation and oxygenation  Description: INTERVENTIONS:  - Assess for changes in respiratory status  - Assess for changes in mentation and behavior  - Position to facilitate oxygenation and minimize respiratory effort  - Oxygen supplementation based on oxygen saturation or ABGs  - Provide Smoking Cessation handout, if applicable  - Encourage broncho-pulmonary hygiene including cough, deep breathe, Incentive Spirometry  - Assess the need for suctioning and perform as needed  - Assess and instruct to report SOB or any respiratory difficulty  - Respiratory Therapy support as indicated  - Manage/alleviate anxiety  - Monitor for signs/symptoms of CO2 retention  Outcome: Progressing

## 2024-06-11 NOTE — CM/SW NOTE
Dr. Serrano has been in discussion with sister re:  [facility].    Cathy BOXA BSN RN CRRN   RN Case Manager  [phone]

## 2024-06-11 NOTE — PROGRESS NOTES
[facility] Hospitalist Progress Note     CC: Hospital Follow up    PCP: No primary care provider on file.       Assessment/Plan:   Ms. Israel is a 69 year old female with PMH sig for CKD stage 4-5, HTN, DM, who presented as cardiac arrest.       Cardiac arrest   Shock   - etiology likely due to hyperkalemia and acidosis, sepsis   - unclear how long patient was down Prior to CPR  - now off pressors  - previously declined HD  - further neurologic evaluation pending, unfortunately poor prognosis given myoclonus noted on exam. Repeat CT done today and negative. MRI brain ordered but cannot be done at the moment as she is intubated and correct circuit not available   -will need to continue ongoing goals of care discussions      Acute respiratory failure  - intubated  - on IV vancomycin and zosyn  - pulm consulted, vent management per pulm    Sepsis  Enterococcus faecium bacteremia   -on zosyn and vancomycin    CORNEL  Metabolic acidosis   CKD stage 4-5  - likely from renal faliure and re-existing renal failure  - nephrology consulted     encephalopathy  Myoclonus  - poss anoxic related  - EEG concerning for severe hypoxic encephalopathy      HTN  -chronic     DM  - SSI and accuchecks  - D5W if sugars low    Coffee ground emesis?  -start IV PPI  -transfuse to keep Hb > 7.0 if ok with family and goals of care     GOC:  - patient next of kin is her sister eusebio and her brother  - as of 6/10 sister was agreeable to DNR full treatment continue current measures await neurologic work up and proceed thereafter      DVT Prophy:scd    Lines: central line place    DVT prophylaxis: SCD  Code status: DNR    Disposition: ICU  Poor prognosis noted     Critical care time 35 minutes     Vidhya Gutierrez [facility] Hospitalist      Subjective:     Intubated, sedated.     OBJECTIVE:    Blood pressure 126/52, pulse 69, temperature 96.9 °F (36.1 °C), temperature source Temporal, resp. rate 14, height 5' 6\" (1.676 m), weight  171 lb 11.8 oz (77.9 kg), SpO2 100%.    Temp:  [95.4 °F (35.2 °C)-98 °F (36.7 °C)] 96.9 °F (36.1 °C)  Pulse:  [56-69] 69  Resp:  [12-22] 14  BP: ()/(43-97) 126/52  SpO2:  [96 %-100 %] 100 %  FiO2 (%):  [30 %-45 %] 30 %      Intake/Output:    Intake/Output Summary (Last 24 hours) at 6/11/2024 1331  Last data filed at 6/11/2024 1100  Gross per 24 hour   Intake 1259.2 ml   Output 1156 ml   Net 103.2 ml       Last 3 Weights   06/11/24 0247 171 lb 11.8 oz (77.9 kg)   06/10/24 0335 165 lb 5.5 oz (75 kg)   06/09/24 2322 200 lb (90.7 kg)       /52 (BP Location: Right arm)   Pulse 69   Temp 96.9 °F (36.1 °C) (Temporal)   Resp 14   Ht 5' 6\" (1.676 m)   Wt 171 lb 11.8 oz (77.9 kg)   SpO2 100%   BMI 27.72 kg/m²   General: intubated sedated  Lungs: clear to ausculation bilaterally  Heart: Regular rate and rhythm  Abdomen: soft, non tender  Extremities: No edema    Data Review:       Labs:     Recent Labs   Lab 06/09/24  2257 06/10/24  0309 06/10/24  1151 06/11/24  0340   RBC 3.51* 3.01*  --  2.56*   HGB 9.9* 8.2* 7.8* 7.0*   HCT 32.2* 26.4*  --  21.9*   MCV 91.7 87.7  --  85.5   MCH 28.2 27.2  --  27.3   MCHC 30.7* 31.1  --  32.0   RDW 15.3* 14.9  --  15.3*   NEPRELIM 2.35 11.71*  --  10.63*   WBC 12.9* 13.6*  --  12.5*   .0 227.0  --  85.0*         Recent Labs   Lab 06/10/24  1604 06/11/24  0340 06/11/24  0841   * 112* 100*   BUN 59* 32* 30*   CREATSERUM 6.66* 4.22* 3.53*   EGFRCR 6* 11* 13*   CA 8.0* 7.9* 8.0*    139 140   K 4.3 4.2 4.1    110 110   CO2 20.0* 22.0 24.0       Recent Labs   Lab 06/09/24  2257 06/10/24  0309 06/11/24  0340   *  --   --    *  --   --    ALB 3.3 2.9* 2.7*         Imaging:  CT BRAIN OR HEAD ([id number])    Result Date: 6/11/2024  PROCEDURE: CT BRAIN OR HEAD (CPT=70450)  COMPARISON: [facility], CT BRAIN OR HEAD (CPT=70450), 6/10/2024, 0:25 AM.  INDICATIONS: anoxia  TECHNIQUE: CT images were obtained without contrast material.   Automated exposure control for dose reduction was used.  Dose information is transmitted to the ACR (American College of Radiology) NRDR (National Radiology Data Registry) which includes the Dose Index Registry.  Findings and impression:  No CT evidence of hypoxic brain injury  No hemorrhage or mass effect or edema  Normal midline ventricles with stable mild chronic ischemia in the periventricular and deep white matter    Dictated by (CST): Jaden Rivers MD on 6/11/2024 at 10:07 AM     Finalized by (CST): Jaden Rivers MD on 6/11/2024 at 10:09 AM          XR CHEST AP PORTABLE  (CPT=71045)    Result Date: 6/10/2024  CONCLUSION:  1. Cardiomegaly.  Tortuous aorta. 2. Bilateral mixed alveolar and interstitial multifocal airspace opacification confluent lung consolidation more pronounced in the right perihilar region with slight improvement at the apex. 3. Persistent left basilar atelectatic changes and/or lung consolidation.  4. Moderate right-sided effusion has increased    Dictated by (CST): Tex Shah MD on 6/10/2024 at 10:23 AM     Finalized by (CST): Tex Shah MD on 6/10/2024 at 10:28 AM          XR CHEST AP PORTABLE  (CPT=71045)    Result Date: 6/10/2024  PROCEDURE: XR CHEST AP PORTABLE  (CPT=71045) TIME: 226  COMPARISON: [facility], XR CHEST AP PORTABLE (CPT=71045), 6/09/2024, 11:15 PM.  INDICATIONS: Verify right side central line placement.  TECHNIQUE:   Single view.   Findings and impression:  Right venous catheter in the lower SVC.  No pneumothorax  ETT is 1.9 cm above the lyric  Enteric tube into the stomach  Stable heart with mild perihilar opacities right greater than left and bilateral layering effusions  [facility] provided a prelim report for this exam. This final report has no significant discrepancies with the [facility] report.  Dictated by (CST): Jaden Rivers MD on 6/10/2024 at 9:05 AM     Finalized by (CST): Jaden Rivers MD on 6/10/2024 at 9:06 AM          CT  BRAIN OR HEAD ([id number])    Result Date: 6/10/2024  PROCEDURE: CT BRAIN OR HEAD (CPT=70450)  COMPARISON: None.  INDICATIONS: arrest  TECHNIQUE: CT images were obtained without contrast material.  Automated exposure control for dose reduction was used.  Dose information is transmitted to the ACR (American College of Radiology) NRDR (National Radiology Data Registry) which includes the Dose Index Registry.  Findings and impression:  No skull fracture  No hemorrhage or mass effect or edema  No hydrocephalus  Mild periventricular chronic ischemia  Mild mucosal thickening in the maxillary sinuses  Subcentimeter dystrophic calcification in the left temporal lobe  [facility] provided a prelim report for this exam. This final report has no significant discrepancies with the [facility] report. .    Dictated by (CST): Jaden Rivers MD on 6/10/2024 at 8:23 AM     Finalized by (CST): Jaden Rivers MD on 6/10/2024 at 8:25 AM          XR CHEST AP PORTABLE  (CPT=71045)    Result Date: 6/10/2024  PROCEDURE: XR CHEST AP PORTABLE  (CPT=71045) TIME: 2315  COMPARISON: [facility], [place], XR CHEST AP PORTABLE (CPT=71045), 6/10/2024, 2:26 AM.  INDICATIONS: Full arrest verification of NG and OG tube placement.  TECHNIQUE:   Single view.   Findings and impression:  Rightward rotation is present  Low lung ETT 0.8 cm above the lyric .  This could be retracted 1-2 centimeter  Heart is magnified by technique  There is an enteric tube into the stomach  Haziness throughout the bilateral lungs likely due to layering pleural effusion right greater than left  No pneumothorax  [facility] provided a prelim report for this exam. This final report has no significant discrepancies with the [facility] report.      Dictated by (CST): Jaden Rivers MD on 6/10/2024 at 8:06 AM     Finalized by (CST): Jaden Rivers MD on 6/10/2024 at 8:08 AM             Meds:      [Held by provider] heparin  5,000 Units Subcutaneous Q8H SHOAIB    insulin regular human   1-7 Units Subcutaneous 4 times per day    pantoprazole  40 mg Intravenous Q12H    insulin degludec  8 Units Subcutaneous Daily    piperacillin-tazobactam  3.375 g Intravenous Q8H    levETIRAcetam  500 mg Intravenous Q24H    vancomycin  15 mg/kg Intravenous Q24H      dextrose 10%      norepinephrine Stopped (06/10/24 0347)    NxStage Pure Flow 401 CRRT/PIRRT fluid 5000mL Stopped (06/11/24 0900)    propofol 35 mcg/kg/min (06/11/24 1207)       dextrose 10%    lipase-protease-amylase (Lip-Prot-Amyl) **AND** sodium bicarbonate    acetaminophen    ondansetron    metoclopramide    polyethylene glycol (PEG 3350)    sennosides    bisacodyl    glucose **OR** glucose **OR** glucose-vitamin C **OR** dextrose **OR** glucose **OR** glucose **OR** glucose-vitamin C    hydrALAzine    LORazepam

## 2024-06-11 NOTE — PLAN OF CARE
Problem: RESPIRATORY - ADULT  Goal: Achieves optimal ventilation and oxygenation  Description: INTERVENTIONS:  - Assess for changes in respiratory status  - Assess for changes in mentation and behavior  - Position to facilitate oxygenation and minimize respiratory effort  - Oxygen supplementation based on oxygen saturation or ABGs  - Provide Smoking Cessation handout, if applicable  - Encourage broncho-pulmonary hygiene including cough, deep breathe, Incentive Spirometry  - Assess the need for suctioning and perform as needed  - Assess and instruct to report SOB or any respiratory difficulty  - Respiratory Therapy support as indicated  - Manage/alleviate anxiety  - Monitor for signs/symptoms of CO2 retention  Outcome: Progressing   Patient received on following vent settings:   06/11/24 1106   Vent Information   Interface Invasive   Vent Type AV   Vent plugged into main power? Yes   Vent Mode VC/AC   Settings   FiO2 (%) 30 %   Resp Rate (Set) 14   Vt (Set, mL) 500 mL   Waveform Decelerating ramp   PEEP/CPAP (cm H2O) 5 cm H20     No SBT due to neurological status.  RT continue to monitor.

## 2024-06-11 NOTE — PROGRESS NOTES
[facility]  [address] [place] [address], [address]  [facility] [id number]  [phone]            Sarita Israel Patient Status:  Inpatient    1955 MRN [id number]   Location [facility] 2W/SW Attending Efraín Marquez MD   Hosp Day # 1 PCP No primary care provider on file.     Subjective:  Sarita Israel is a(n) 69 year old female.    Hospital course to date:     Patient with a history of CKD, hypertension, diabetes, she was not feeling well but then became acutely unresponsive.  Found to be in asystole return explained circulation was achieved and EEG.  EEG showed burst suppression even off of propofol.  She was having stimulus induced myoclonus as well.    Current Facility-Administered Medications   Medication Dose Route Frequency    [Held by provider] heparin (Porcine) 5000 UNIT/ML injection 5,000 Units  5,000 Units Subcutaneous Q8H SHOAIB    acetaminophen (Tylenol Extra Strength) tab 500 mg  500 mg Oral Q4H PRN    ondansetron (Zofran) 4 MG/2ML injection 4 mg  4 mg Intravenous Q6H PRN    metoclopramide (Reglan) 5 mg/mL injection 5 mg  5 mg Intravenous Q8H PRN    polyethylene glycol (PEG 3350) (Miralax) 17 g oral packet 17 g  17 g Oral Daily PRN    sennosides (Senokot) tab 17.2 mg  17.2 mg Oral Nightly PRN    bisacodyl (Dulcolax) 10 MG rectal suppository 10 mg  10 mg Rectal Daily PRN    glucose (Dex4) 15 GM/59ML oral liquid 15 g  15 g Oral Q15 Min PRN    Or    glucose (Glutose) 40% oral gel 15 g  15 g Oral Q15 Min PRN    Or    glucose-vitamin C (Dex-4) chewable tab 4 tablet  4 tablet Oral Q15 Min PRN    Or    dextrose 50% injection 50 mL  50 mL Intravenous Q15 Min PRN    Or    glucose (Dex4) 15 GM/59ML oral liquid 30 g  30 g Oral Q15 Min PRN    Or    glucose (Glutose) 40% oral gel 30 g  30 g Oral Q15 Min PRN    Or    glucose-vitamin C (Dex-4) chewable tab 8 tablet  8 tablet Oral Q15 Min PRN    insulin regular human (Novolin R, Humulin R) 100 UNIT/ML injection 1-7 Units  1-7 Units  Subcutaneous 4 times per day    norepinephrine (Levophed) 4 mg/250mL infusion premix  0.5-30 mcg/min Intravenous Continuous    pantoprazole (Protonix) 40 mg in sodium chloride 0.9% PF 10 mL IV push  40 mg Intravenous Q12H    insulin degludec 100 units/mL flextouch 8 Units  8 Units Subcutaneous Daily    NxStage Pure Flow 401 CRRT/PIRRT fluid 5000mL  2 L/hr CRRT Continuous    piperacillin-tazobactam (Zosyn) 3.375 g in dextrose 5% 100 mL IVPB-ADDV  3.375 g Intravenous Q8H    hydrALAzine (Apresoline) 20 mg/mL injection 10 mg  10 mg Intravenous Q6H PRN    LORazepam (Ativan) 2 mg/mL injection 2 mg  2 mg Intravenous Q30 Min PRN    levETIRAcetam (Keppra) 500 mg/5mL injection 500 mg  500 mg Intravenous Q24H    vancomycin (Vancocin) 1.25 g in sodium chloride 0.9% 250mL IVPB premix  15 mg/kg Intravenous Q24H    propofol (Diprivan) 10 mg/mL infusion premix  5-50 mcg/kg/min Intravenous Continuous       Objective:  Blood pressure 130/52, pulse 61, temperature (!) 95.4 °F (35.2 °C), temperature source Temporal, resp. rate 14, height 66\", weight 171 lb 11.8 oz (77.9 kg), SpO2 99%.    Physical Exam:  Vitals:    06/11/24 0400 06/11/24 0500 06/11/24 0600 06/11/24 0700   BP: 127/62 124/54 133/54 130/52   Pulse: 58 59 60 61   Resp: 14 14 14 14   Temp:       TempSrc:       SpO2: 99% 98% 99% 99%   Weight:       Height:           General: No apparent distress, well nourished, well groomed.  Head- Normocephalic, atraumatic  Eyes- No redness or swelling  Neck- No masses or adenopathy  CV: pulses were palpable and normal, no cyanosis or edema     Neurological:     Mental Status-intubated, continues to have some very subtle myoclonic jerks.  Pupillary reflexes were slightly red bilaterally, right slightly larger than the left, some triple flexion to the painful stimulation lower extremities.    Lab Results   Component Value Date    WBC 12.5 (H) 06/11/2024    HGB 7.0 (L) 06/11/2024    HCT 21.9 (L) 06/11/2024    PLT 85.0 (L) 06/11/2024     CREATSERUM 4.22 (H) 06/11/2024    BUN 32 (H) 06/11/2024     06/11/2024    K 4.2 06/11/2024     06/11/2024    CO2 22.0 06/11/2024     (H) 06/11/2024    CA 7.9 (L) 06/11/2024    ALB 2.7 (L) 06/11/2024    ALKPHO 108 06/09/2024    BILT <0.2 (L) 06/09/2024    TP 6.1 06/09/2024     (H) 06/09/2024     (H) 06/09/2024    TSH 3.670 06/09/2024    MG 2.1 06/10/2024    PHOS 3.7 06/11/2024     06/09/2024    B12 1,320 (H) 06/10/2024    ETOH <3 06/09/2024       XR CHEST AP PORTABLE  (CPT=71045)    Result Date: 6/10/2024  CONCLUSION:  1. Cardiomegaly.  Tortuous aorta. 2. Bilateral mixed alveolar and interstitial multifocal airspace opacification confluent lung consolidation more pronounced in the right perihilar region with slight improvement at the apex. 3. Persistent left basilar atelectatic changes and/or lung consolidation.  4. Moderate right-sided effusion has increased    Dictated by (CST): Tex Shah MD on 6/10/2024 at 10:23 AM     Finalized by (CST): Tex Shah MD on 6/10/2024 at 10:28 AM          XR CHEST AP PORTABLE  (CPT=71045)    Result Date: 6/10/2024  PROCEDURE: XR CHEST AP PORTABLE  (CPT=71045) TIME: 226  COMPARISON: [facility], XR CHEST AP PORTABLE (CPT=71045), 6/09/2024, 11:15 PM.  INDICATIONS: Verify right side central line placement.  TECHNIQUE:   Single view.   Findings and impression:  Right venous catheter in the lower SVC.  No pneumothorax  ETT is 1.9 cm above the lyric  Enteric tube into the stomach  Stable heart with mild perihilar opacities right greater than left and bilateral layering effusions  [facility] provided a prelim report for this exam. This final report has no significant discrepancies with the [facility] report.  Dictated by (CST): Jaden Rivers MD on 6/10/2024 at 9:05 AM     Finalized by (CST): Jaden Rivers MD on 6/10/2024 at 9:06 AM          CT BRAIN OR HEAD (99101)    Result Date: 6/10/2024  PROCEDURE: CT BRAIN OR  HEAD (CPT=70450)  COMPARISON: None.  INDICATIONS: arrest  TECHNIQUE: CT images were obtained without contrast material.  Automated exposure control for dose reduction was used.  Dose information is transmitted to the ACR (American College of Radiology) NRDR (National Radiology Data Registry) which includes the Dose Index Registry.  Findings and impression:  No skull fracture  No hemorrhage or mass effect or edema  No hydrocephalus  Mild periventricular chronic ischemia  Mild mucosal thickening in the maxillary sinuses  Subcentimeter dystrophic calcification in the left temporal lobe  [facility] provided a prelim report for this exam. This final report has no significant discrepancies with the [facility] report. .    Dictated by (CST): Jaden Rivers MD on 6/10/2024 at 8:23 AM     Finalized by (CST): Jaden Rivers MD on 6/10/2024 at 8:25 AM          XR CHEST AP PORTABLE  (CPT=71045)    Result Date: 6/10/2024  PROCEDURE: XR CHEST AP PORTABLE  (CPT=71045) TIME: 2315  COMPARISON: [facility], XR CHEST AP PORTABLE (CPT=71045), 6/10/2024, 2:26 AM.  INDICATIONS: Full arrest verification of NG and OG tube placement.  TECHNIQUE:   Single view.   Findings and impression:  Rightward rotation is present  Low lung ETT 0.8 cm above the lyric .  This could be retracted 1-2 centimeter  Heart is magnified by technique  There is an enteric tube into the stomach  Haziness throughout the bilateral lungs likely due to layering pleural effusion right greater than left  No pneumothorax  [facility] provided a prelim report for this exam. This final report has no significant discrepancies with the [facility] report.      Dictated by (CST): Jaden Rivers MD on 6/10/2024 at 8:06 AM     Finalized by (CST): Jaden Rivers MD on 6/10/2024 at 8:08 AM         EKG 12 Lead    Result Date: 6/9/2024  Sinus rhythm with sinus arrhythmia with short FL with Fusion complexes Possible Left atrial enlargement Left axis deviation Incomplete  right bundle branch block Minimal voltage criteria for LVH, may be normal variant ( R in aVL ) ST & T wave abnormality, consider inferior ischemia ST & T wave abnormality, consider anterolateral ischemia Abnormal ECG No previous ECGs found in [facility]       Assessment:  Patient Active Problem List   Diagnosis    Cardiac arrest (HCC)    Lactic acidosis    Acute kidney injury (HCC)    Hyperkalemia    Hyperglycemia    Elevated troponin    Transaminitis    Pleural effusion    Anoxic brain injury (HCC)    Myoclonus     Patient with cardiac arrest and most likely significant anoxic brain injury.  EEG pattern carries very poor prognosis.  CT of the head will be repeated to assess for any possibility of generalized edema if that is not revealing then consider doing MRI as well.  Prognosis remains very poor for any meaningful neurological recovery.    Yuan Branham MD  6/11/2024  7:51 AM          35+ minutes critical care time spent reviewing record, evaluating patient, speaking with family, discussion w/ colleagues / coordination of studies, documenting in the chart, while at bedside or immediately available.  Patient is critically ill.

## 2024-06-12 NOTE — PROGRESS NOTES
[facility]    Sarita Israel Patient Status:  Inpatient    1955 MRN [id number]   Location [facility] 2W/SW Attending Vidhya Serrano DO   Hosp Day # 2 PCP No primary care provider on file.     Date of Admission: 2024 10:47 PM    Admission Diagnosis: Cardiac arrest (HCC) [I46.9]  Hyperkalemia [E87.5]  Lactic acidosis [E87.20]  Pleural effusion [J90]  Transaminitis [R74.01]  Hyperglycemia [R73.9]  Elevated troponin [R79.89]  Acute kidney injury (HCC) [N17.9]    S:  s/p CRRT catheter replacement yesterday. No other overnight events. Some spontaneous myoclonus.   Off pressors.     Scheduled Medications:     [Held by provider] heparin  5,000 Units Subcutaneous Q8H SHOAIB    insulin regular human  1-7 Units Subcutaneous 4 times per day    pantoprazole  40 mg Intravenous Q12H    insulin degludec  8 Units Subcutaneous Daily    piperacillin-tazobactam  3.375 g Intravenous Q8H    levETIRAcetam  500 mg Intravenous Q24H    vancomycin  15 mg/kg Intravenous Q24H       Infusing Medications:     dextrose 10%      norepinephrine Stopped (06/10/24 0347)    NxStage Pure Flow 401 CRRT/PIRRT fluid 5000mL 2 L/hr (24 0837)    propofol 15 mcg/kg/min (24 1000)       PRN Medications:    dextrose 10%    lipase-protease-amylase (Lip-Prot-Amyl) **AND** sodium bicarbonate    dextrose    acetaminophen    ondansetron    metoclopramide    polyethylene glycol (PEG 3350)    sennosides    bisacodyl    glucose **OR** glucose **OR** glucose-vitamin C **OR** dextrose **OR** glucose **OR** glucose **OR** glucose-vitamin C    hydrALAzine    LORazepam    OBJECTIVE:  /62 (BP Location: Right arm)   Pulse 62   Temp (!) 95.6 °F (35.3 °C) (Temporal)   Resp 14   Ht 167.6 cm (5' 6\")   Wt 169 lb 1.5 oz (76.7 kg)   SpO2 100%   BMI 27.29 kg/m²    Temp (24hrs), Av.9 °F (36.6 °C), Min:95.6 °F (35.3 °C), Max:99.6 °F (37.6 °C)      Vent Mode: VC/AC  FiO2 (%):  [30 %] 30 %  S RR:  [14] 14  S VT:   [500 mL] 500 mL  PEEP/CPAP (cm H2O):  [5 cm H20] 5 cm H20  MAP (cm H2O):  [7-9] 8      Wt Readings from Last 3 Encounters:   06/12/24 169 lb 1.5 oz (76.7 kg)       I/O last 3 completed shifts:  In: 1589.8 [I.V.:1535.9; IV PIGGYBACK:53.9]  Out: 1452 [Urine:25; Emesis/NG output:270; Other:1157]  I/O this shift:  In: 131.9 [I.V.:70.9; IV PIGGYBACK:61]  Out: 82 [Other:82]     Vent Mode: VC/AC  FiO2 (%):  [30 %] 30 %  S RR:  [14] 14  S VT:  [500 mL] 500 mL  PEEP/CPAP (cm H2O):  [5 cm H20] 5 cm H20  MAP (cm H2O):  [7-9] 8  General: intubated and NAD.  Neuro: Sedated. No spont movement or withdrawal.   HEENT: PERRL  Neck : No LAD  CV: RRR, nl S1, S2, no S4, S3 or murmur.   Lungs: Clear bilaterally.   Abd: Nontender, non distended.    Ext: No edema.   Skin: No rashes.       Recent Labs   Lab 06/10/24  0309 06/10/24  1151 06/11/24  0340 06/12/24  0810   WBC 13.6*  --  12.5* 12.3*   HGB 8.2* 7.8* 7.0* 6.5*   HCT 26.4*  --  21.9* 19.5*   .0  --  85.0* 82.0*     Recent Labs   Lab 06/09/24  2257 06/10/24  0309 06/10/24  0810 06/11/24  0340 06/11/24  0841 06/11/24  1803 06/12/24  0810   * 251*   < > 112* 100* 85 83  84   BUN 62* 66*   < > 32* 30* 34* 22  22   CREATSERUM 8.33* 7.97*   < > 4.22* 3.53* 4.19* 2.83*  2.84*   CA 8.8 8.0*   < > 7.9* 8.0* 7.8* 7.5*  7.6*    142   < > 139 140 140 138  139   K 5.5* 4.2   < > 4.2 4.1 4.6 4.0  4.1    112   < > 110 110 110 108  108   CO2 15.0* 16.0*   < > 22.0 24.0 23.0 26.0  27.0   *  --   --   --   --   --   --    *  --   --   --   --   --   --    ALB 3.3 2.9*  --  2.7*  --   --  2.4*    < > = values in this interval not displayed.     No results for input(s): \"INR\", \"PTT\" in the last 168 hours.  Recent Labs   Lab 06/10/24  1047   ABGPHT 7.33*   ZWRJPA0O 28*   LMDWL4R 220*   ABGHCO3 17.1*   SITE Right Radial       COVID-19 Lab Results    COVID-19  No results found for: \"COVID19\"    Pro-Calcitonin  No results for input(s): \"PCT\" in the last 168  hours.    Cardiac  No results for input(s): \"TROP\", \"PBNP\" in the last 168 hours.    Creatinine Kinase  Recent Labs   Lab 06/09/24  2257          Inflammatory Markers  Recent Labs   Lab 06/10/24  0309   SIERRA 379.2*       Imaging:   CXR 6/11:  1. Right IJ dialysis catheter tip at the RA SVC junction.  No pneumothorax.   2. CHF/fluid overload.  Left retrocardiac opacification may be related to compressive atelectasis from pleural effusion, or coexistent pneumonia.         Chest images personally reviewed.   CT head 6/11:  No CT evidence of hypoxic brain injury      No hemorrhage or mass effect or edema      Normal midline ventricles with stable mild chronic ischemia in the periventricular and deep white matter   EEG 6/10:  This is a markedly abnormal EEG even off sedation. The EEG reflects a burst-suppression pattern. This pattern is consistent with severe hypoxic encephalopathy, certain stages of general anesthesia, and drug-induced coma.   Echo 6/10  1. Left ventricle: The cavity size was normal. Wall thickness was at the      upper limits of normal. Systolic function was mildly reduced. The      estimated ejection fraction was 45%, by visual assessment. There was mild      diffuse hypokinesis. Left ventricular diastolic function parameters were      normal for the patient's age.   2. Left atrium: The atrium was moderately dilated.   3. Right atrium: The estimated central venous pressure is 8mm Hg.   4. Pericardium, extracardiac: A small pericardial effusion was identified      along the right ventricular free wall and along the right atrial free      wall. There was no evidence of hemodynamic compromise. There was a      moderate-sized left pleural effusion.   Assessment/Plan   Acute respiratory failure secondary to poor MS and cardiac arrest.   - continue full vent support  - Weaning if MS improves.   Cardiac arrest - likely due to hyperK and acidemia in the setting of sepsis  - normothermia protocol  - RRT  as below  - antibiotics as below  Shock  - off vasopressors  - antibiotics  Sepsis - blood culture with Enterococcus faecium 1/2 bld cx 6/9  - IV vancomycin 6/10  - Zosyn 6/10-  Encephalopathy - concern for anoxic encephalopathy ? Seizures.   - MRI brain pending.   - per neurology, noted EEG pattern with poor prognosis.   CORNEL on CKD, hyperkalemia  - RRT per nephrology  Coffee ground output from OGT  - PPI  Anemia  - transfuse to keep hgb >7  Transfuse 1 unit PRBC today.   DM  - degludec  - SSI  FEN  - start TFs  Ppx  - SCDs  - PPI  Dispo  - DNAR/full  - poor prognosis discussed with family. Noted neuro prognostication     35 mins spent in critical care time, reviewing data, and discussion with family and treatment team.     My best regards,         Efrain Cee MD  [facility] Pulmonary, Critical Care and Sleep Medicine

## 2024-06-12 NOTE — PROGRESS NOTES
[facility]  part of [facility]    Progress Note    Sarita Israel Patient Status:  Inpatient    1955 MRN [id number]   Location [facility] 2W/SW Attending Efraín Marquez MD   Hosp Day # 2 PCP No primary care provider on file.       Subjective:     Tolerating CRRT overnight.    Objective:   Vital Signs:  Blood pressure (!) 170/117, pulse 84, temperature 96.5 °F (35.8 °C), temperature source Temporal, resp. rate (!) 27, height 5' 6\" (1.676 m), weight 169 lb 1.5 oz (76.7 kg), SpO2 99%.  General: Intubated and non responsive  HEENT: ETT in place  Respiratory: Ventilatory breath sounds.  Cardiovascular: S1, S2.    Abdomen: Soft, nontender, nondistended.  Positive bowel sounds.  Ext: No LE edema  Neuro: intubated and non responsive    Results:     Lab Results   Component Value Date    WBC 12.3 (H) 2024    HGB 6.5 (LL) 2024    HCT 19.5 (L) 2024    PLT 82.0 (L) 2024    CREATSERUM 2.83 (H) 2024    CREATSERUM 2.84 (H) 2024    BUN 22 2024    BUN 22 2024     2024     2024    K 4.0 2024    K 4.1 2024     2024     2024    CO2 26.0 2024    CO2 27.0 2024    GLU 83 2024    GLU 84 2024    CA 7.5 (L) 2024    CA 7.6 (L) 2024    ALB 2.4 (L) 2024    ALKPHO 108 2024    BILT <0.2 (L) 2024    TP 6.1 2024     (H) 2024     (H) 2024    TSH 3.670 2024    MG 1.7 2024    PHOS 3.0 2024     2024    B12 1,320 (H) 06/10/2024    ETOH <3 2024       XR CHEST AP PORTABLE  (CPT=71045)    Result Date: 2024  CONCLUSION:  1. Right IJ dialysis catheter tip at the RA SVC junction.  No pneumothorax. 2. CHF/fluid overload.  Left retrocardiac opacification may be related to compressive atelectasis from pleural effusion, or coexistent pneumonia.     Dictated by (CST): Reinier Tucker MD on 2024 at  5:17 PM     Finalized by (CST): Reinier Tucker MD on 6/11/2024 at 5:21 PM          CT BRAIN OR HEAD (78824)    Result Date: 6/11/2024  PROCEDURE: CT BRAIN OR HEAD (CPT=70450)  COMPARISON: [facility], CT BRAIN OR HEAD (CPT=70450), 6/10/2024, 0:25 AM.  INDICATIONS: anoxia  TECHNIQUE: CT images were obtained without contrast material.  Automated exposure control for dose reduction was used.  Dose information is transmitted to the ACR (American College of Radiology) NRDR (National Radiology Data Registry) which includes the Dose Index Registry.  Findings and impression:  No CT evidence of hypoxic brain injury  No hemorrhage or mass effect or edema  Normal midline ventricles with stable mild chronic ischemia in the periventricular and deep white matter    Dictated by (CST): Jaden Rivers MD on 6/11/2024 at 10:07 AM     Finalized by (CST): Jaden Rivers MD on 6/11/2024 at 10:09 AM                 Assessment and Plan:   69 year old female with PMH of CKD stage 4-5 with baseline creatinine around 3.0 in 9/2023,she is admitted now s/p cardiac arrest:     CORNEL on CKD:  - baseline Creatinine around 3.0 in 9/2023  - primary nephrologist Dr. Beena Cardenas  - Discussed with sister rodney, patient had refused RRT in the past, but patient had not specified in case of emergency if she would want RRT. Sister wanted to proceed with CRRT if indicated.  - Resume CRRT at this time for CORNEL on CKD and acidosis.  - I did discuss having a time limited trial given patients co morbidities and poor prognosis     Metabolic acidosis:  - From lactic acidosis and cardiac arrest  - CRRT as above.     Cardiac arrest:  - per primary and cardiology.     Anemia:  - from anemia of chronic disease and ABLA  - transfuse as per primary.     Shock:  - off vasopressors at this time.    AMS/anoxic brain injury:  - Per neurology.     Critical care time >35minutes.     Thank you for allowing me to participate in the care of your patient.     Jun  MD Dawna  [facility]  [facility]

## 2024-06-12 NOTE — PROGRESS NOTES
[facility]  [address] [place] [address], [address]  [facility] [address]  [phone]            Sarita Israel Patient Status:  Inpatient    1955 MRN [id number]   Location [facility] 2W/SW Attending Efraín Marquez MD   Hosp Day # 2 PCP No primary care provider on file.     Subjective:  Sarita Israel is a(n) 69 year old female.    Hospital course to date:     Patient with a history of CKD, hypertension, diabetes, she was not feeling well but then became acutely unresponsive.  Found to be in asystole return explained circulation was achieved and EEG.  EEG showed burst suppression even off of propofol.  She was having stimulus induced myoclonus as well.  Off sedation twitching would come back.  She patient was treated with [facility]-Administered Medications   Medication Dose Route Frequency    dextrose 10% infusion (TPN no rate)   Intravenous Continuous PRN    pancrelipase (Lip-Prot-Amyl) (Zenpep) DR particles cap 10,000 Units  10,000 Units Per G Tube PRN    And    sodium bicarbonate tab 325 mg  325 mg Oral PRN    dextrose 5% infusion   Intravenous Daily PRN    [Held by provider] heparin (Porcine) 5000 UNIT/ML injection 5,000 Units  5,000 Units Subcutaneous Q8H SHOAIB    acetaminophen (Tylenol Extra Strength) tab 500 mg  500 mg Oral Q4H PRN    ondansetron (Zofran) 4 MG/2ML injection 4 mg  4 mg Intravenous Q6H PRN    metoclopramide (Reglan) 5 mg/mL injection 5 mg  5 mg Intravenous Q8H PRN    polyethylene glycol (PEG 3350) (Miralax) 17 g oral packet 17 g  17 g Oral Daily PRN    sennosides (Senokot) tab 17.2 mg  17.2 mg Oral Nightly PRN    bisacodyl (Dulcolax) 10 MG rectal suppository 10 mg  10 mg Rectal Daily PRN    glucose (Dex4) 15 GM/59ML oral liquid 15 g  15 g Oral Q15 Min PRN    Or    glucose (Glutose) 40% oral gel 15 g  15 g Oral Q15 Min PRN    Or    glucose-vitamin C (Dex-4) chewable tab 4 tablet  4 tablet Oral Q15 Min PRN    Or    dextrose 50% injection 50 mL  50  mL Intravenous Q15 Min PRN    Or    glucose (Dex4) 15 GM/59ML oral liquid 30 g  30 g Oral Q15 Min PRN    Or    glucose (Glutose) 40% oral gel 30 g  30 g Oral Q15 Min PRN    Or    glucose-vitamin C (Dex-4) chewable tab 8 tablet  8 tablet Oral Q15 Min PRN    insulin regular human (Novolin R, Humulin R) 100 UNIT/ML injection 1-7 Units  1-7 Units Subcutaneous 4 times per day    norepinephrine (Levophed) 4 mg/250mL infusion premix  0.5-30 mcg/min Intravenous Continuous    pantoprazole (Protonix) 40 mg in sodium chloride 0.9% PF 10 mL IV push  40 mg Intravenous Q12H    insulin degludec 100 units/mL flextouch 8 Units  8 Units Subcutaneous Daily    NxStage Pure Flow 401 CRRT/PIRRT fluid 5000mL  2 L/hr CRRT Continuous    piperacillin-tazobactam (Zosyn) 3.375 g in dextrose 5% 100 mL IVPB-ADDV  3.375 g Intravenous Q8H    hydrALAzine (Apresoline) 20 mg/mL injection 10 mg  10 mg Intravenous Q6H PRN    LORazepam (Ativan) 2 mg/mL injection 2 mg  2 mg Intravenous Q30 Min PRN    levETIRAcetam (Keppra) 500 mg/5mL injection 500 mg  500 mg Intravenous Q24H    vancomycin (Vancocin) 1.25 g in sodium chloride 0.9% 250mL IVPB premix  15 mg/kg Intravenous Q24H    propofol (Diprivan) 10 mg/mL infusion premix  5-50 mcg/kg/min Intravenous Continuous       Objective:  Blood pressure 124/53, pulse 57, temperature 97.8 °F (36.6 °C), temperature source Temporal, resp. rate 14, height 66\", weight 169 lb 1.5 oz (76.7 kg), SpO2 100%.    Physical Exam:  Vitals:    06/12/24 0530 06/12/24 0600 06/12/24 0700 06/12/24 0800   BP: 123/54 132/57 120/41 124/53   Pulse: 60 62 58 57   Resp: 14 14 14 14   Temp:       TempSrc:       SpO2: 100% 100% 99% 100%   Weight:       Height:           General: No apparent distress, well nourished, well groomed.  Head- Normocephalic, atraumatic  Eyes- No redness or swelling  Neck- No masses or adenopathy  CV: pulses were palpable and normal, no cyanosis or edema     Neurological:     Mental Status-intubated, continues to  have some very subtle myoclonic jerks.  Pupillary reflexes were slightly red bilaterally, right slightly larger than the left, some triple flexion to the painful stimulation lower extremities.    Lab Results   Component Value Date    WBC 12.5 (H) 06/11/2024    HGB 7.0 (L) 06/11/2024    HCT 21.9 (L) 06/11/2024    PLT 85.0 (L) 06/11/2024    CREATSERUM 4.19 (H) 06/11/2024    BUN 34 (H) 06/11/2024     06/11/2024    K 4.6 06/11/2024     06/11/2024    CO2 23.0 06/11/2024    GLU 85 06/11/2024    CA 7.8 (L) 06/11/2024    ALB 2.7 (L) 06/11/2024    ALKPHO 108 06/09/2024    BILT <0.2 (L) 06/09/2024    TP 6.1 06/09/2024     (H) 06/09/2024     (H) 06/09/2024    TSH 3.670 06/09/2024    MG 1.7 06/11/2024    PHOS 3.7 06/11/2024     06/09/2024    B12 1,320 (H) 06/10/2024    ETOH <3 06/09/2024       XR CHEST AP PORTABLE  (CPT=71045)    Result Date: 6/11/2024  CONCLUSION:  1. Right IJ dialysis catheter tip at the RA SVC junction.  No pneumothorax. 2. CHF/fluid overload.  Left retrocardiac opacification may be related to compressive atelectasis from pleural effusion, or coexistent pneumonia.     Dictated by (CST): Reinier Tucker MD on 6/11/2024 at 5:17 PM     Finalized by (CST): Reinier Tucker MD on 6/11/2024 at 5:21 PM          CT BRAIN OR HEAD (64519)    Result Date: 6/11/2024  PROCEDURE: CT BRAIN OR HEAD (CPT=70450)  COMPARISON: [facility], CT BRAIN OR HEAD (CPT=70450), 6/10/2024, 0:25 AM.  INDICATIONS: anoxia  TECHNIQUE: CT images were obtained without contrast material.  Automated exposure control for dose reduction was used.  Dose information is transmitted to the ACR (American College of Radiology) NRDR (National Radiology Data Registry) which includes the Dose Index Registry.  Findings and impression:  No CT evidence of hypoxic brain injury  No hemorrhage or mass effect or edema  Normal midline ventricles with stable mild chronic ischemia in the periventricular and deep white  matter    Dictated by (CST): Jaden Rivers MD on 6/11/2024 at 10:07 AM     Finalized by (CST): Jaden Rivers MD on 6/11/2024 at 10:09 AM          XR CHEST AP PORTABLE  (CPT=71045)    Result Date: 6/10/2024  CONCLUSION:  1. Cardiomegaly.  Tortuous aorta. 2. Bilateral mixed alveolar and interstitial multifocal airspace opacification confluent lung consolidation more pronounced in the right perihilar region with slight improvement at the apex. 3. Persistent left basilar atelectatic changes and/or lung consolidation.  4. Moderate right-sided effusion has increased    Dictated by (CST): Tex Shah MD on 6/10/2024 at 10:23 AM     Finalized by (CST): Tex Shah MD on 6/10/2024 at 10:28 AM                 Assessment:  Patient Active Problem List   Diagnosis    Cardiac arrest (HCC)    Lactic acidosis    Acute kidney injury (HCC)    Hyperkalemia    Hyperglycemia    Elevated troponin    Transaminitis    Pleural effusion    Anoxic brain injury (HCC)    Myoclonus     Patient with cardiac arrest and most likely significant anoxic brain injury.  EEG pattern carries very poor prognosis.  CT of the head was repeated and that was still not revealing.  I discussed poor prognosis with the family at bedside, they are waiting for MRI to be done to confirm the anoxic injury  Prognosis remains very poor for any meaningful neurological recovery.    Yuan Branham MD

## 2024-06-12 NOTE — PLAN OF CARE
CRRT restarted at 1923. Family at beside.   Pt remains on propofol gtt. Q2 turns   .       Problem: Patient Centered Care  Goal: Patient preferences are identified and integrated in the patient's plan of care  Description: Interventions:  - What would you like us to know as we care for you?   - Provide timely, complete, and accurate information to patient/family  - Incorporate patient and family knowledge, values, beliefs, and cultural backgrounds into the planning and delivery of care  - Encourage patient/family to participate in care and decision-making at the level they choose  - Honor patient and family perspectives and choices  Outcome: Progressing     Problem: Diabetes/Glucose Control  Goal: Glucose maintained within prescribed range  Description: INTERVENTIONS:  - Monitor Blood Glucose as ordered  - Assess for signs and symptoms of hyperglycemia and hypoglycemia  - Administer ordered medications to maintain glucose within target range  - Assess barriers to adequate nutritional intake and initiate nutrition consult as needed  - Instruct patient on self management of diabetes  Outcome: Progressing     Problem: Patient/Family Goals  Goal: Patient/Family Long Term Goal  Description: Patient's Long Term Goal:     Interventions:  -   - See additional Care Plan goals for specific interventions  Outcome: Progressing  Goal: Patient/Family Short Term Goal  Description: Patient's Short Term Goal:     Interventions:   -   - See additional Care Plan goals for specific interventions  Outcome: Progressing     Problem: Delirium  Goal: Minimize duration of delirium  Description: Interventions:  - Encourage use of hearing aids, eye glasses  - Promote highest level of mobility daily  - Provide frequent reorientation  - Promote wakefulness i.e. lights on, blinds open  - Promote sleep, encourage patient's normal rest cycle i.e. lights off, TV off, minimize noise and interruptions  - Encourage family to assist in orientation and  promotion of home routines  Outcome: Progressing     Problem: RESPIRATORY - ADULT  Goal: Achieves optimal ventilation and oxygenation  Description: INTERVENTIONS:  - Assess for changes in respiratory status  - Assess for changes in mentation and behavior  - Position to facilitate oxygenation and minimize respiratory effort  - Oxygen supplementation based on oxygen saturation or ABGs  - Provide Smoking Cessation handout, if applicable  - Encourage broncho-pulmonary hygiene including cough, deep breathe, Incentive Spirometry  - Assess the need for suctioning and perform as needed  - Assess and instruct to report SOB or any respiratory difficulty  - Respiratory Therapy support as indicated  - Manage/alleviate anxiety  - Monitor for signs/symptoms of CO2 retention  Outcome: Progressing

## 2024-06-12 NOTE — PROGRESS NOTES
[facility] Hospitalist Progress Note     CC: Hospital Follow up    PCP: No primary care provider on file.       Assessment/Plan:   Ms. Israel is a 69 year old female with PMH sig for CKD stage 4-5, HTN, DM, who presented as cardiac arrest.       Cardiac arrest   Shock   - etiology likely due to hyperkalemia and acidosis, sepsis   - unclear how long patient was down Prior to CPR, likely more than 30 minutes per family  - now off pressors  - previously declined HD, now on CRRT  - neurologic assessment in progress  - CT head done yesterday   - EEG completed  - MRI was ordered, family at this time considering still pursuing   - continue goals of care discussions, suspect family will pursue comfort cares soon     Acute respiratory failure  - intubated  - on IV vancomycin and zosyn  - pulm consulted, vent management per pulm    Sepsis  Enterococcus faecium bacteremia   -on zosyn and vancomycin    CORNEL  Metabolic acidosis   CKD stage 4-5  - likely from renal faliure and re-existing renal failure  - nephrology consulted     encephalopathy  Myoclonus  - anoxic related  - EEG concerning for severe hypoxic encephalopathy      HTN  -chronic     DM  - SSI and accuchecks  - D5W if sugars low    Coffee ground emesis?  -start IV PPI  -transfuse to keep Hb > 7.0 if ok with family and goals of care  -give one unit today since we are continuing supportive cares     GOC:  - patient next of kin is her sister eusebio and her brother  - DNR  - spoke to sister yesterday who did not want to withdraw care just yet until discussed with family     DVT Prophy:scd    Lines: central line place    DVT prophylaxis: SCD  Code status: DNR    Disposition: ICU    Poor prognosis     Critical care time 35 minutes     Vidhya Gutierrez [facility] Hospitalist      Subjective:     Intubated, sedated.     OBJECTIVE:    Blood pressure 144/44, pulse 60, temperature (!) 95.6 °F (35.3 °C), temperature source Temporal, resp. rate 14, height 5' 6\"  (1.676 m), weight 169 lb 1.5 oz (76.7 kg), SpO2 100%.    Temp:  [95.6 °F (35.3 °C)-99.6 °F (37.6 °C)] 95.6 °F (35.3 °C)  Pulse:  [] 60  Resp:  [14-31] 14  BP: ()/() 144/44  SpO2:  [95 %-100 %] 100 %  FiO2 (%):  [30 %] 30 %      Intake/Output:    Intake/Output Summary (Last 24 hours) at 6/12/2024 0921  Last data filed at 6/12/2024 0900  Gross per 24 hour   Intake 1200 ml   Output 702 ml   Net 498 ml       Last 3 Weights   06/12/24 0156 169 lb 1.5 oz (76.7 kg)   06/11/24 0247 171 lb 11.8 oz (77.9 kg)   06/10/24 0335 165 lb 5.5 oz (75 kg)   06/09/24 2322 200 lb (90.7 kg)       /44 (BP Location: Right arm)   Pulse 60   Temp (!) 95.6 °F (35.3 °C) (Temporal)   Resp 14   Ht 5' 6\" (1.676 m)   Wt 169 lb 1.5 oz (76.7 kg)   SpO2 100%   BMI 27.29 kg/m²   General: intubated sedated  Lungs: clear to ausculation bilaterally  Heart: Regular rate and rhythm  Abdomen: soft, non tender  Extremities: No edema    Data Review:       Labs:     Recent Labs   Lab 06/10/24  0309 06/10/24  1151 06/11/24  0340 06/12/24  0810   RBC 3.01*  --  2.56* 2.29*   HGB 8.2* 7.8* 7.0* 6.5*   HCT 26.4*  --  21.9* 19.5*   MCV 87.7  --  85.5 85.2   MCH 27.2  --  27.3 28.4   MCHC 31.1  --  32.0 33.3   RDW 14.9  --  15.3* 15.8*   NEPRELIM 11.71*  --  10.63* 9.98*   WBC 13.6*  --  12.5* 12.3*   .0  --  85.0* 82.0*         Recent Labs   Lab 06/11/24  0841 06/11/24  1803 06/12/24  0810   * 85 83  84   BUN 30* 34* 22  22   CREATSERUM 3.53* 4.19* 2.83*  2.84*   EGFRCR 13* 11* 17*  17*   CA 8.0* 7.8* 7.5*  7.6*    140 138  139   K 4.1 4.6 4.0  4.1    110 108  108   CO2 24.0 23.0 26.0  27.0       Recent Labs   Lab 06/09/24  2257 06/10/24  0309 06/11/24  0340 06/12/24  0810   *  --   --   --    *  --   --   --    ALB 3.3 2.9* 2.7* 2.4*         Imaging:  XR CHEST AP PORTABLE  (CPT=71045)    Result Date: 6/11/2024  CONCLUSION:  1. Right IJ dialysis catheter tip at the RA SVC junction.  No  pneumothorax. 2. CHF/fluid overload.  Left retrocardiac opacification may be related to compressive atelectasis from pleural effusion, or coexistent pneumonia.     Dictated by (CST): Reinier Tucker MD on 6/11/2024 at 5:17 PM     Finalized by (CST): Reinier Tucker MD on 6/11/2024 at 5:21 PM          CT BRAIN OR HEAD (65123)    Result Date: 6/11/2024  PROCEDURE: CT BRAIN OR HEAD (CPT=70450)  COMPARISON: [facility], CT BRAIN OR HEAD (CPT=70450), 6/10/2024, 0:25 AM.  INDICATIONS: anoxia  TECHNIQUE: CT images were obtained without contrast material.  Automated exposure control for dose reduction was used.  Dose information is transmitted to the ACR (American College of Radiology) NRDR (National Radiology Data Registry) which includes the Dose Index Registry.  Findings and impression:  No CT evidence of hypoxic brain injury  No hemorrhage or mass effect or edema  Normal midline ventricles with stable mild chronic ischemia in the periventricular and deep white matter    Dictated by (CST): Jaden Rivers MD on 6/11/2024 at 10:07 AM     Finalized by (CST): Jaden Rivers MD on 6/11/2024 at 10:09 AM          XR CHEST AP PORTABLE  (CPT=71045)    Result Date: 6/10/2024  CONCLUSION:  1. Cardiomegaly.  Tortuous aorta. 2. Bilateral mixed alveolar and interstitial multifocal airspace opacification confluent lung consolidation more pronounced in the right perihilar region with slight improvement at the apex. 3. Persistent left basilar atelectatic changes and/or lung consolidation.  4. Moderate right-sided effusion has increased    Dictated by (CST): Tex Shah MD on 6/10/2024 at 10:23 AM     Finalized by (CST): Tex Shah MD on 6/10/2024 at 10:28 AM          XR CHEST AP PORTABLE  (CPT=71045)    Result Date: 6/10/2024  PROCEDURE: XR CHEST AP PORTABLE  (CPT=71045) TIME: 226  COMPARISON: [facility], XR CHEST AP PORTABLE (CPT=71045), 6/09/2024, 11:15 PM.  INDICATIONS: Verify right side  central line placement.  TECHNIQUE:   Single view.   Findings and impression:  Right venous catheter in the lower SVC.  No pneumothorax  ETT is 1.9 cm above the lyric  Enteric tube into the stomach  Stable heart with mild perihilar opacities right greater than left and bilateral layering effusions  [facility] provided a prelim report for this exam. This final report has no significant discrepancies with the [facility] report.  Dictated by (CST): Jaden Rivers MD on 6/10/2024 at 9:05 AM     Finalized by (CST): Jaden Rivers MD on 6/10/2024 at 9:06 AM          CT BRAIN OR HEAD (97809)    Result Date: 6/10/2024  PROCEDURE: CT BRAIN OR HEAD (CPT=70450)  COMPARISON: None.  INDICATIONS: arrest  TECHNIQUE: CT images were obtained without contrast material.  Automated exposure control for dose reduction was used.  Dose information is transmitted to the ACR (American College of Radiology) NRDR (National Radiology Data Registry) which includes the Dose Index Registry.  Findings and impression:  No skull fracture  No hemorrhage or mass effect or edema  No hydrocephalus  Mild periventricular chronic ischemia  Mild mucosal thickening in the maxillary sinuses  Subcentimeter dystrophic calcification in the left temporal lobe  [facility] provided a prelim report for this exam. This final report has no significant discrepancies with the [facility] report. .    Dictated by (CST): Jaden Rivers MD on 6/10/2024 at 8:23 AM     Finalized by (CST): Jaden Rivers MD on 6/10/2024 at 8:25 AM          XR CHEST AP PORTABLE  (CPT=71045)    Result Date: 6/10/2024  PROCEDURE: XR CHEST AP PORTABLE  (CPT=71045) TIME: 2315  COMPARISON: [facility], XR CHEST AP PORTABLE (CPT=71045), 6/10/2024, 2:26 AM.  INDICATIONS: Full arrest verification of NG and OG tube placement.  TECHNIQUE:   Single view.   Findings and impression:  Rightward rotation is present  Low lung ETT 0.8 cm above the lyric .  This could be retracted 1-2 centimeter   Heart is magnified by technique  There is an enteric tube into the stomach  Haziness throughout the bilateral lungs likely due to layering pleural effusion right greater than left  No pneumothorax  [facility] provided a prelim report for this exam. This final report has no significant discrepancies with the [facility] report.      Dictated by (CST): Jaden Rivers MD on 6/10/2024 at 8:06 AM     Finalized by (CST): Jaden Rivers MD on 6/10/2024 at 8:08 AM             Meds:      [Held by provider] heparin  5,000 Units Subcutaneous Q8H SHOAIB    insulin regular human  1-7 Units Subcutaneous 4 times per day    pantoprazole  40 mg Intravenous Q12H    insulin degludec  8 Units Subcutaneous Daily    piperacillin-tazobactam  3.375 g Intravenous Q8H    levETIRAcetam  500 mg Intravenous Q24H    vancomycin  15 mg/kg Intravenous Q24H      dextrose 10%      norepinephrine Stopped (06/10/24 0347)    NxStage Pure Flow 401 CRRT/PIRRT fluid 5000mL 2 L/hr (06/12/24 0837)    propofol 20 mcg/kg/min (06/12/24 0900)       dextrose 10%    lipase-protease-amylase (Lip-Prot-Amyl) **AND** sodium bicarbonate    dextrose    acetaminophen    ondansetron    metoclopramide    polyethylene glycol (PEG 3350)    sennosides    bisacodyl    glucose **OR** glucose **OR** glucose-vitamin C **OR** dextrose **OR** glucose **OR** glucose **OR** glucose-vitamin C    hydrALAzine    LORazepam

## 2024-06-12 NOTE — PROGRESS NOTES
[facility] CARDIOLOGY Progress Note      Sarita Israel is a 69 year old female who I assessed as follows:  Chief Complaint   Patient presents with    Cardiac Arrest          REASON FOR CONSULTATION:    \"cardiac arrest\"            ASSESSMENT/PLAN:     IMPRESSIONS:  Cardiac arrest, may very well be related to metabolic issues from worsening renal failure and sepsis. Rhythm has been stable  Hypotension/shock, now off pressors  Elevated troponin  Acute resp failure, intubated  HTN  DM  anoxic encephalopathy. EEG with poor prognosis  Sepsis/Enterococcus faecium bacteremia   CORNEL on CKD        PLAN:  Echo reviewed  CRRT   Await to see if neurologic recovery  Poor prognosis        Critical care time: 35 minutes        Subjective:    Intubated. Sedated.    --------------------------------------------------------------------------------------------------------------------------------    HPI:         History of DM, HTN presents with not feeling well. Family was to bring patient to hospital but she arrested. 911 called. Asystole with paramedics; ACLS performed. In ER, given epi, the DARION with pulse. Then had bradycardia, given atropine. Seizure like activity in ER. Severely acidotic.    Was recently told that creat in 7s and she refused hemodialysis.    Currently pateint is intubated, sedated (no purposeful response when off sedation).             No current outpatient medications on file.      No past medical history on file.  No past surgical history on file.  No family history on file.   Social History:  Social History     Socioeconomic History    Marital status: Single     Social Determinants of Health     Food Insecurity: Unknown (6/10/2024)    Food Insecurity     Food Insecurity: Patient unable to answer   Transportation Needs: Unknown (6/10/2024)    Transportation Needs     Lack of Transportation: Patient unable to answer   Housing Stability: Unknown (6/10/2024)    Housing Stability     Housing Instability:  Patient unable to answer          Medications:  Current Facility-Administered Medications   Medication Dose Route Frequency    dextrose 10% infusion (TPN no rate)   Intravenous Continuous PRN    pancrelipase (Lip-Prot-Amyl) (Zenpep) DR particles cap 10,000 Units  10,000 Units Per G Tube PRN    And    sodium bicarbonate tab 325 mg  325 mg Oral PRN    dextrose 5% infusion   Intravenous Daily PRN    [Held by provider] heparin (Porcine) 5000 UNIT/ML injection 5,000 Units  5,000 Units Subcutaneous Q8H SHOAIB    acetaminophen (Tylenol Extra Strength) tab 500 mg  500 mg Oral Q4H PRN    ondansetron (Zofran) 4 MG/2ML injection 4 mg  4 mg Intravenous Q6H PRN    metoclopramide (Reglan) 5 mg/mL injection 5 mg  5 mg Intravenous Q8H PRN    polyethylene glycol (PEG 3350) (Miralax) 17 g oral packet 17 g  17 g Oral Daily PRN    sennosides (Senokot) tab 17.2 mg  17.2 mg Oral Nightly PRN    bisacodyl (Dulcolax) 10 MG rectal suppository 10 mg  10 mg Rectal Daily PRN    glucose (Dex4) 15 GM/59ML oral liquid 15 g  15 g Oral Q15 Min PRN    Or    glucose (Glutose) 40% oral gel 15 g  15 g Oral Q15 Min PRN    Or    glucose-vitamin C (Dex-4) chewable tab 4 tablet  4 tablet Oral Q15 Min PRN    Or    dextrose 50% injection 50 mL  50 mL Intravenous Q15 Min PRN    Or    glucose (Dex4) 15 GM/59ML oral liquid 30 g  30 g Oral Q15 Min PRN    Or    glucose (Glutose) 40% oral gel 30 g  30 g Oral Q15 Min PRN    Or    glucose-vitamin C (Dex-4) chewable tab 8 tablet  8 tablet Oral Q15 Min PRN    insulin regular human (Novolin R, Humulin R) 100 UNIT/ML injection 1-7 Units  1-7 Units Subcutaneous 4 times per day    norepinephrine (Levophed) 4 mg/250mL infusion premix  0.5-30 mcg/min Intravenous Continuous    pantoprazole (Protonix) 40 mg in sodium chloride 0.9% PF 10 mL IV push  40 mg Intravenous Q12H    insulin degludec 100 units/mL flextouch 8 Units  8 Units Subcutaneous Daily    NxStage Pure Flow 401 CRRT/PIRRT fluid 5000mL  2 L/hr CRRT Continuous     piperacillin-tazobactam (Zosyn) 3.375 g in dextrose 5% 100 mL IVPB-ADDV  3.375 g Intravenous Q8H    hydrALAzine (Apresoline) 20 mg/mL injection 10 mg  10 mg Intravenous Q6H PRN    LORazepam (Ativan) 2 mg/mL injection 2 mg  2 mg Intravenous Q30 Min PRN    levETIRAcetam (Keppra) 500 mg/5mL injection 500 mg  500 mg Intravenous Q24H    vancomycin (Vancocin) 1.25 g in sodium chloride 0.9% 250mL IVPB premix  15 mg/kg Intravenous Q24H    propofol (Diprivan) 10 mg/mL infusion premix  5-50 mcg/kg/min Intravenous Continuous           Allergies  No Known Allergies            REVIEW OF SYSTEMS:   Patient unable to answer          EXAM:         TELE: SR          /57 (BP Location: Right arm)   Pulse 62   Temp 97.8 °F (36.6 °C) (Temporal)   Resp 14   Ht 5' 6\" (1.676 m)   Wt 169 lb 1.5 oz (76.7 kg)   SpO2 100%   BMI 27.29 kg/m²   Temp (24hrs), Av.1 °F (36.7 °C), Min:96.9 °F (36.1 °C), Max:99.6 °F (37.6 °C)    Wt Readings from Last 3 Encounters:   24 169 lb 1.5 oz (76.7 kg)         Intake/Output Summary (Last 24 hours) at 2024 0637  Last data filed at 2024 0600  Gross per 24 hour   Intake 1146.8 ml   Output 727 ml   Net 419.8 ml         GENERAL:intubated  SKIN: no rashes  HEENT: atraumatic, normocephalic, throat without erythema  NECK: supple, no bruits  LUNGS: coarse breath sounds  CARDIO: RRR without murmur or S3   GI: soft, nontender  EXTREMITIES: no edema  NEUROLOGY: not alert  PSYCH: cooperative          LABORATORY DATA:               ECG:         ECHO:          Labs:  Recent Labs   Lab 24  0340 24  0841 24  1803   * 100* 85   BUN 32* 30* 34*   CREATSERUM 4.22* 3.53* 4.19*   CA 7.9* 8.0* 7.8*    140 140   K 4.2 4.1 4.6    110 110   CO2 22.0 24.0 23.0     No results for input(s): \"TROP\" in the last 168 hours.  Recent Labs   Lab 24  0340   RBC 2.56*   HGB 7.0*   HCT 21.9*   MCV 85.5   MCH 27.3   MCHC 32.0   RDW 15.3*   NEPRELIM 10.63*   WBC 12.5*   PLT  85.0*     Recent Labs   Lab 06/09/24  2257 06/11/24  0340   TROPHS 347* 609*     --        Imaging:  XR CHEST AP PORTABLE  (CPT=71045)    Result Date: 6/11/2024  CONCLUSION:  1. Right IJ dialysis catheter tip at the RA SVC junction.  No pneumothorax. 2. CHF/fluid overload.  Left retrocardiac opacification may be related to compressive atelectasis from pleural effusion, or coexistent pneumonia.     Dictated by (CST): Reinier Tucker MD on 6/11/2024 at 5:17 PM     Finalized by (CST): Reinier Tucker MD on 6/11/2024 at 5:21 PM          CT BRAIN OR HEAD ([id number])    Result Date: 6/11/2024  PROCEDURE: CT BRAIN OR HEAD (CPT=70450)  COMPARISON: [facility], CT BRAIN OR HEAD (CPT=70450), 6/10/2024, 0:25 AM.  INDICATIONS: anoxia  TECHNIQUE: CT images were obtained without contrast material.  Automated exposure control for dose reduction was used.  Dose information is transmitted to the ACR (American College of Radiology) NRDR (National Radiology Data Registry) which includes the Dose Index Registry.  Findings and impression:  No CT evidence of hypoxic brain injury  No hemorrhage or mass effect or edema  Normal midline ventricles with stable mild chronic ischemia in the periventricular and deep white matter    Dictated by (CST): Jaden Rivers MD on 6/11/2024 at 10:07 AM     Finalized by (CST): Jaden Rivers MD on 6/11/2024 at 10:09 AM          XR CHEST AP PORTABLE  (CPT=71045)    Result Date: 6/10/2024  CONCLUSION:  1. Cardiomegaly.  Tortuous aorta. 2. Bilateral mixed alveolar and interstitial multifocal airspace opacification confluent lung consolidation more pronounced in the right perihilar region with slight improvement at the apex. 3. Persistent left basilar atelectatic changes and/or lung consolidation.  4. Moderate right-sided effusion has increased    Dictated by (CST): Tex Shah MD on 6/10/2024 at 10:23 AM     Finalized by (CST): Tex Shah MD on 6/10/2024 at 10:28 AM          XR  CHEST AP PORTABLE  (CPT=71045)    Result Date: 6/10/2024  PROCEDURE: XR CHEST AP PORTABLE  (CPT=71045) TIME: 226  COMPARISON: [facility], XR CHEST AP PORTABLE (CPT=71045), 6/09/2024, 11:15 PM.  INDICATIONS: Verify right side central line placement.  TECHNIQUE:   Single view.   Findings and impression:  Right venous catheter in the lower SVC.  No pneumothorax  ETT is 1.9 cm above the lyric  Enteric tube into the stomach  Stable heart with mild perihilar opacities right greater than left and bilateral layering effusions  [facility] provided a prelim report for this exam. This final report has no significant discrepancies with the [facility] report.  Dictated by (CST): Jaden Rivers MD on 6/10/2024 at 9:05 AM     Finalized by (CST): Jaden Rivers MD on 6/10/2024 at 9:06 AM          CT BRAIN OR HEAD ([id number])    Result Date: 6/10/2024  PROCEDURE: CT BRAIN OR HEAD (CPT=70450)  COMPARISON: None.  INDICATIONS: arrest  TECHNIQUE: CT images were obtained without contrast material.  Automated exposure control for dose reduction was used.  Dose information is transmitted to the ACR (American College of Radiology) NRDR (National Radiology Data Registry) which includes the Dose Index Registry.  Findings and impression:  No skull fracture  No hemorrhage or mass effect or edema  No hydrocephalus  Mild periventricular chronic ischemia  Mild mucosal thickening in the maxillary sinuses  Subcentimeter dystrophic calcification in the left temporal lobe  [facility] provided a prelim report for this exam. This final report has no significant discrepancies with the [facility] report. .    Dictated by (CST): Jaden Rivers MD on 6/10/2024 at 8:23 AM     Finalized by (CST): Jaden Rivers MD on 6/10/2024 at 8:25 AM          XR CHEST AP PORTABLE  (CPT=71045)    Result Date: 6/10/2024  PROCEDURE: XR CHEST AP PORTABLE  (CPT=71045) TIME: 2315  COMPARISON: [facility], XR CHEST AP PORTABLE (CPT=71045), 6/10/2024,  2:26 AM.  INDICATIONS: Full arrest verification of NG and OG tube placement.  TECHNIQUE:   Single view.   Findings and impression:  Rightward rotation is present  Low lung ETT 0.8 cm above the lyric .  This could be retracted 1-2 centimeter  Heart is magnified by technique  There is an enteric tube into the stomach  Haziness throughout the bilateral lungs likely due to layering pleural effusion right greater than left  No pneumothorax  [facility] provided a prelim report for this exam. This final report has no significant discrepancies with the [facility] report.      Dictated by (CST): Jaden Rivers MD on 6/10/2024 at 8:06 AM     Finalized by (CST): Jaden Rivers MD on 6/10/2024 at 8:08 AM                Lazarus Winn MD

## 2024-06-12 NOTE — PROGRESS NOTES
06/12/24 0300   Vent Information   Vent Mode VC/AC   Settings   FiO2 (%) 30 %   Resp Rate (Set) 14   Vt (Set, mL) 500 mL   Waveform Decelerating ramp   PEEP/CPAP (cm H2O) 5 cm H20     Pt remains on full vent support. No acute resp events overnight. RT will continue to monitor

## 2024-06-13 NOTE — PLAN OF CARE
Pt does not follow cammands, intermittanly have jerking seizure activity when suctioning. Mimin. Response to painful stimuli. Prn ativan.Pt was started on keppra, seizure precations maintained.  Sinus r. Scd's on  pt remains vented. 30% fio2.  Thick copiuus secretions. Freq oral and et suctioning.   Propofol at 15 mics pt has had ng to lis, has had coffee ground output, today is bile colored, holding tube feed, r/t mri results and family deciding plan of care.  Mri is pending after 4 pm today. Daughter will decide plan of care after mri results. [facility] updated of this.  Pt is a candidate for corneal  if pt is termianlly weaned then they will not be a [facility] candidate for organ. Due to her age. They stated call them upon death.  Spoke to toe with [facility].  Crrt off at this time. Will restart later today depending on what family decides. Updated Dr riojas that daughter is at bedside.  Problem: Patient Centered Care  Goal: Patient preferences are identified and integrated in the patient's plan of care  Description: Interventions:  - What would you like us to know as we care for you?  - Provide timely, complete, and accurate information to patient/family  - Incorporate patient and family knowledge, values, beliefs, and cultural backgrounds into the planning and delivery of care  - Encourage patient/family to participate in care and decision-making at the level they choose  - Honor patient and family perspectives and choices  Outcome: Not Progressing     Problem: Diabetes/Glucose Control  Goal: Glucose maintained within prescribed range  Description: INTERVENTIONS:  - Monitor Blood Glucose as ordered  - Assess for signs and symptoms of hyperglycemia and hypoglycemia  - Administer ordered medications to maintain glucose within target range  - Assess barriers to adequate nutritional intake and initiate nutrition consult as needed  - Instruct patient on self management of diabetes  Outcome: Not Progressing     Problem:  Patient/Family Goals  Goal: Patient/Family Long Term Goal  Description: Patient's Long Term Goal:     Interventions:  - See additional Care Plan goals for specific interventions  Outcome: Not Progressing  Goal: Patient/Family Short Term Goal  Description: Patient's Short Term Goal:    Interventions:  - See additional Care Plan goals for specific interventions  Outcome: Not Progressing     Problem: Delirium  Goal: Minimize duration of delirium  Description: Interventions:  - Encourage use of hearing aids, eye glasses  - Promote highest level of mobility daily  - Provide frequent reorientation  - Promote wakefulness i.e. lights on, blinds open  - Promote sleep, encourage patient's normal rest cycle i.e. lights off, TV off, minimize noise and interruptions  - Encourage family to assist in orientation and promotion of home routines  Outcome: Not Progressing     Problem: RESPIRATORY - ADULT  Goal: Achieves optimal ventilation and oxygenation  Description: INTERVENTIONS:  - Assess for changes in respiratory status  - Assess for changes in mentation and behavior  - Position to facilitate oxygenation and minimize respiratory effort  - Oxygen supplementation based on oxygen saturation or ABGs  - Provide Smoking Cessation handout, if applicable  - Encourage broncho-pulmonary hygiene including cough, deep breathe, Incentive Spirometry  - Assess the need for suctioning and perform as needed  - Assess and instruct to report SOB or any respiratory difficulty  - Respiratory Therapy support as indicated  - Manage/alleviate anxiety  - Monitor for signs/symptoms of CO2 retention  Outcome: Not Progressing     Problem: CARDIOVASCULAR - ADULT  Goal: Maintains optimal cardiac output and hemodynamic stability  Description: INTERVENTIONS:  - Monitor vital signs, rhythm, and trends  - Monitor for bleeding, hypotension and signs of decreased cardiac output  - Evaluate effectiveness of vasoactive medications to optimize hemodynamic  stability  - Monitor arterial and/or venous puncture sites for bleeding and/or hematoma  - Assess quality of pulses, skin color and temperature  - Assess for signs of decreased coronary artery perfusion - ex. Angina  - Evaluate fluid balance, assess for edema, trend weights  Outcome: Not Progressing  Goal: Absence of cardiac arrhythmias or at baseline  Description: INTERVENTIONS:  - Continuous cardiac monitoring, monitor vital signs, obtain 12 lead EKG if indicated  - Evaluate effectiveness of antiarrhythmic and heart rate control medications as ordered  - Initiate emergency measures for life threatening arrhythmias  - Monitor electrolytes and administer replacement therapy as ordered  Outcome: Not Progressing

## 2024-06-13 NOTE — PROGRESS NOTES
[facility]  [address] [place] [address], [address]  [facility] [address]  [phone]            Sarita Israel Patient Status:  Inpatient    1955 MRN [id number]   Location [facility] 2W/SW Attending Efraín Marquez MD   Hosp Day # 3 PCP No primary care provider on file.     Subjective:  Sarita Israel is a(n) 69 year old female.    Hospital course to date:     Patient with a history of CKD, hypertension, diabetes, she was not feeling well but then became acutely unresponsive.  Found to be in asystole return explained circulation was achieved and EEG.  EEG showed burst suppression even off of propofol.  She was having stimulus induced myoclonus as well.  Off sedation twitching would come back.  She patient was treated with [facility]    Current Facility-Administered Medications   Medication Dose Route Frequency    acetaminophen (Tylenol) tab 650 mg  650 mg Oral Q4H PRN    Or    acetaminophen (Tylenol) 160 MG/5ML oral liquid 650 mg  650 mg Oral Q4H PRN    Or    acetaminophen (Tylenol) rectal suppository 650 mg  650 mg Rectal Q4H PRN    Or    acetaminophen (Ofirmev) 10 mg/mL infusion premix 1,000 mg  1,000 mg Intravenous Q6H PRN    chlorhexidine gluconate (Peridex) 0.12 % oral solution 15 mL  15 mL Mouth/Throat BID@0800,2000    ampicillin (Omnipen) 2 g in sodium chloride 0.9% 100 mL IVPB-MBP  2 g Intravenous Q8H    dextrose 10% infusion (TPN no rate)   Intravenous Continuous PRN    pancrelipase (Lip-Prot-Amyl) (Zenpep) DR particles cap 10,000 Units  10,000 Units Per G Tube PRN    And    sodium bicarbonate tab 325 mg  325 mg Oral PRN    dextrose 5% infusion   Intravenous Daily PRN    [Held by provider] heparin (Porcine) 5000 UNIT/ML injection 5,000 Units  5,000 Units Subcutaneous Q8H [facility]    acetaminophen (Tylenol Extra Strength) tab 500 mg  500 mg Oral Q4H PRN    ondansetron (Zofran) 4 MG/2ML injection 4 mg  4 mg Intravenous Q6H PRN    metoclopramide (Reglan) 5 mg/mL injection 5 mg  5 mg  Intravenous Q8H PRN    polyethylene glycol (PEG 3350) (Miralax) 17 g oral packet 17 g  17 g Oral Daily PRN    sennosides (Senokot) tab 17.2 mg  17.2 mg Oral Nightly PRN    bisacodyl (Dulcolax) 10 MG rectal suppository 10 mg  10 mg Rectal Daily PRN    glucose (Dex4) 15 GM/59ML oral liquid 15 g  15 g Oral Q15 Min PRN    Or    glucose (Glutose) 40% oral gel 15 g  15 g Oral Q15 Min PRN    Or    glucose-vitamin C (Dex-4) chewable tab 4 tablet  4 tablet Oral Q15 Min PRN    Or    dextrose 50% injection 50 mL  50 mL Intravenous Q15 Min PRN    Or    glucose (Dex4) 15 GM/59ML oral liquid 30 g  30 g Oral Q15 Min PRN    Or    glucose (Glutose) 40% oral gel 30 g  30 g Oral Q15 Min PRN    Or    glucose-vitamin C (Dex-4) chewable tab 8 tablet  8 tablet Oral Q15 Min PRN    insulin regular human (Novolin R, Humulin R) 100 UNIT/ML injection 1-7 Units  1-7 Units Subcutaneous 4 times per day    norepinephrine (Levophed) 4 mg/250mL infusion premix  0.5-30 mcg/min Intravenous Continuous    pantoprazole (Protonix) 40 mg in sodium chloride 0.9% PF 10 mL IV push  40 mg Intravenous Q12H    insulin degludec 100 units/mL flextouch 8 Units  8 Units Subcutaneous Daily    NxStage Pure Flow 401 CRRT/PIRRT fluid 5000mL  2 L/hr CRRT Continuous    hydrALAzine (Apresoline) 20 mg/mL injection 10 mg  10 mg Intravenous Q6H PRN    LORazepam (Ativan) 2 mg/mL injection 2 mg  2 mg Intravenous Q30 Min PRN    levETIRAcetam (Keppra) 500 mg/5mL injection 500 mg  500 mg Intravenous Q24H    propofol (Diprivan) 10 mg/mL infusion premix  5-50 mcg/kg/min Intravenous Continuous       Objective:  Blood pressure 115/44, pulse 62, temperature 98.3 °F (36.8 °C), temperature source Temporal, resp. rate 14, height 66\", weight 167 lb 12.3 oz (76.1 kg), SpO2 100%.    Physical Exam:  Vitals:    06/13/24 0500 06/13/24 0600 06/13/24 0700 06/13/24 0800   BP: 114/43 122/44 119/45 115/44   Pulse: 75 72 69 62   Resp: 14 14 14 14   Temp:    98.3 °F (36.8 °C)   TempSrc:    Temporal    SpO2: 99% 100% 100% 100%   Weight:  167 lb 12.3 oz (76.1 kg)     Height:           General: No apparent distress, well nourished, well groomed.  Head- Normocephalic, atraumatic  Eyes- No redness or swelling  Neck- No masses or adenopathy  CV: pulses were palpable and normal, no cyanosis or edema     Neurological:     Mental Status-intubated, continues to have some very subtle myoclonic jerks.  Pupillary reflexes were slightly red bilaterally, right slightly larger than the left, some triple flexion to the painful stimulation lower extremities.    Lab Results   Component Value Date    WBC 13.4 (H) 06/13/2024    HGB 8.0 (L) 06/13/2024    HCT 23.8 (L) 06/13/2024    .0 (L) 06/13/2024    CREATSERUM 1.89 (H) 06/13/2024    BUN 14 06/13/2024     06/13/2024    K 4.3 06/13/2024     06/13/2024    CO2 27.0 06/13/2024    GLU 71 06/13/2024    CA 7.8 (L) 06/13/2024    ALB 2.5 (L) 06/13/2024    ALKPHO 108 06/09/2024    BILT <0.2 (L) 06/09/2024    TP 6.1 06/09/2024     (H) 06/09/2024     (H) 06/09/2024    TSH 3.670 06/09/2024    MG 1.7 06/12/2024    PHOS 2.1 (L) 06/13/2024     06/09/2024    B12 1,320 (H) 06/10/2024    ETOH <3 06/09/2024       XR CHEST AP PORTABLE  (CPT=71045)    Result Date: 6/11/2024  CONCLUSION:  1. Right IJ dialysis catheter tip at the RA SVC junction.  No pneumothorax. 2. CHF/fluid overload.  Left retrocardiac opacification may be related to compressive atelectasis from pleural effusion, or coexistent pneumonia.     Dictated by (CST): Reinier Tucker MD on 6/11/2024 at 5:17 PM     Finalized by (CST): Reinier Tucker MD on 6/11/2024 at 5:21 PM          CT BRAIN OR HEAD ([id number])    Result Date: 6/11/2024  PROCEDURE: CT BRAIN OR HEAD (CPT=70450)  COMPARISON: [facility], CT BRAIN OR HEAD (CPT=70450), 6/10/2024, 0:25 AM.  INDICATIONS: anoxia  TECHNIQUE: CT images were obtained without contrast material.  Automated exposure control for dose reduction was used.   Dose information is transmitted to the ACR (American College of Radiology) NRDR (National Radiology Data Registry) which includes the Dose Index Registry.  Findings and impression:  No CT evidence of hypoxic brain injury  No hemorrhage or mass effect or edema  Normal midline ventricles with stable mild chronic ischemia in the periventricular and deep white matter    Dictated by (CST): Jaden Rivers MD on 6/11/2024 at 10:07 AM     Finalized by (CST): Jaden Rivers MD on 6/11/2024 at 10:09 AM                 Assessment:  Patient Active Problem List   Diagnosis    Cardiac arrest (HCC)    Lactic acidosis    Acute kidney injury (HCC)    Hyperkalemia    Hyperglycemia    Elevated troponin    Transaminitis    Pleural effusion    Anoxic brain injury (HCC)    Myoclonus     Patient with cardiac arrest and most likely significant anoxic brain injury.  EEG pattern carries very poor prognosis.  CT of the head was repeated and that was still not revealing.  I discussed poor prognosis with the family at bedside, they are waiting for MRI to be done to confirm the anoxic injury  Prognosis remains very poor for any meaningful neurological recovery.    Yuan Branham MD

## 2024-06-13 NOTE — HOSPICE RN NOTE
[facility] received referral. Will follow up with family to schedule meeting.     ELAYNE CalderonN, RN  [facility]  [phone] (After-hours)

## 2024-06-13 NOTE — PROGRESS NOTES
[facility] Hospitalist Progress Note     CC: Hospital Follow up    PCP: No primary care provider on file.       Assessment/Plan:   Ms. Israel is a 69 year old female with PMH sig for CKD stage 4-5, HTN, DM, who presented as cardiac arrest.       Cardiac arrest   Shock   - etiology likely due to hyperkalemia and acidosis, sepsis   - unclear how long patient was down Prior to CPR, likely more than 30 minutes per family  - now off pressors  - previously declined HD, now on CRRT  - neurologic assessment in progress---MRI brain likely today   - EEG completed  - continue goals of care discussions, suspect family will pursue comfort cares soon, after MRI completed     Acute respiratory failure  - intubated  - pulm consulted, vent management per pulm    Sepsis  Enterococcus faecium bacteremia   -antibiotics narrowed to ampicillin IV given culture sensitivity     CORNEL  Metabolic acidosis   CKD stage 4-5  - likely from renal faliure and re-existing renal failure  - nephrology consulted     encephalopathy  Myoclonus  - anoxic related  - EEG concerning for severe hypoxic encephalopathy      HTN  -chronic     DM  - SSI and accuchecks  - D5W if sugars low    Coffee ground emesis?, appears resolved  -IV PPI  -transfuse to keep Hb > 7.0 if ok with family and goals of care  -PRBC given on 6/12  -give one unit today since we are continuing supportive cares     GOC:  - patient next of kin is her sister eusebio and her brother  - DNR     DVT Prophy:scd    Lines: central line place    DVT prophylaxis: SCD  Code status: DNR    Disposition: ICU    Poor prognosis. Will consult hospice as will likely require terminal wean and pure comfort measures    Critical care time 35 minutes     [facility] Hospitalist      Subjective:     Intubated, sedated.     OBJECTIVE:    Blood pressure 119/47, pulse 62, temperature 98.3 °F (36.8 °C), temperature source Temporal, resp. rate 14, height 5' 6\" (1.676 m), weight 167 lb  12.3 oz (76.1 kg), SpO2 100%.    Temp:  [96.4 °F (35.8 °C)-98.4 °F (36.9 °C)] 98.3 °F (36.8 °C)  Pulse:  [62-95] 62  Resp:  [14-27] 14  BP: (114-170)/() 119/47  SpO2:  [99 %-100 %] 100 %  FiO2 (%):  [30 %] 30 %      Intake/Output:    Intake/Output Summary (Last 24 hours) at 6/13/2024 1011  Last data filed at 6/13/2024 1000  Gross per 24 hour   Intake 1044.2 ml   Output 1539 ml   Net -494.8 ml       Last 3 Weights   06/13/24 0600 167 lb 12.3 oz (76.1 kg)   06/12/24 0156 169 lb 1.5 oz (76.7 kg)   06/11/24 0247 171 lb 11.8 oz (77.9 kg)   06/10/24 0335 165 lb 5.5 oz (75 kg)   06/09/24 2322 200 lb (90.7 kg)       /47   Pulse 62   Temp 98.3 °F (36.8 °C) (Temporal)   Resp 14   Ht 5' 6\" (1.676 m)   Wt 167 lb 12.3 oz (76.1 kg)   SpO2 100%   BMI 27.08 kg/m²   General: intubated sedated  Lungs: clear to ausculation bilaterally  Heart: Regular rate and rhythm  Abdomen: soft, non tender  Extremities: No edema    Data Review:       Labs:     Recent Labs   Lab 06/11/24  0340 06/12/24  0810 06/12/24  1544 06/13/24  0343   RBC 2.56* 2.29*  --  2.79*   HGB 7.0* 6.5* 8.7* 8.0*   HCT 21.9* 19.5* 25.9* 23.8*   MCV 85.5 85.2  --  85.3   MCH 27.3 28.4  --  28.7   MCHC 32.0 33.3  --  33.6   RDW 15.3* 15.8*  --  15.1*   NEPRELIM 10.63* 9.98*  --  11.10*   WBC 12.5* 12.3*  --  13.4*   PLT 85.0* 82.0*  --  101.0*         Recent Labs   Lab 06/12/24  1544 06/13/24  0343 06/13/24  0820   GLU 82 71 80   BUN 17 14 14   CREATSERUM 2.29* 1.89* 1.75*   EGFRCR 22* 28* 31*   CA 8.3* 7.8* 7.7*    139 139   K 4.4 4.3 4.2    107 108   CO2 25.0 27.0 27.0       Recent Labs   Lab 06/09/24  2257 06/10/24  0309 06/11/24  0340 06/12/24  0810 06/13/24  0343   *  --   --   --   --    *  --   --   --   --    ALB 3.3 2.9* 2.7* 2.4* 2.5*         Imaging:  XR CHEST AP PORTABLE  (CPT=71045)    Result Date: 6/11/2024  CONCLUSION:  1. Right IJ dialysis catheter tip at the RA SVC junction.  No pneumothorax. 2. CHF/fluid  overload.  Left retrocardiac opacification may be related to compressive atelectasis from pleural effusion, or coexistent pneumonia.     Dictated by (CST): Reinier Tucker MD on 6/11/2024 at 5:17 PM     Finalized by (CST): Reinier Tucker MD on 6/11/2024 at 5:21 PM          CT BRAIN OR HEAD (29978)    Result Date: 6/11/2024  PROCEDURE: CT BRAIN OR HEAD (CPT=70450)  COMPARISON: [facility], CT BRAIN OR HEAD (CPT=70450), 6/10/2024, 0:25 AM.  INDICATIONS: anoxia  TECHNIQUE: CT images were obtained without contrast material.  Automated exposure control for dose reduction was used.  Dose information is transmitted to the ACR (American College of Radiology) NRDR (National Radiology Data Registry) which includes the Dose Index Registry.  Findings and impression:  No CT evidence of hypoxic brain injury  No hemorrhage or mass effect or edema  Normal midline ventricles with stable mild chronic ischemia in the periventricular and deep white matter    Dictated by (CST): Jaden Rivers MD on 6/11/2024 at 10:07 AM     Finalized by (CST): Jaden Rivers MD on 6/11/2024 at 10:09 AM             Meds:      chlorhexidine gluconate  15 mL Mouth/Throat BID@0800,2000    ampicillin  2 g Intravenous Q8H    [Held by provider] heparin  5,000 Units Subcutaneous Q8H SHOAIB    insulin regular human  1-7 Units Subcutaneous 4 times per day    pantoprazole  40 mg Intravenous Q12H    insulin degludec  8 Units Subcutaneous Daily    levETIRAcetam  500 mg Intravenous Q24H      dextrose 10%      norepinephrine Stopped (06/10/24 0347)    NxStage Pure Flow 401 CRRT/PIRRT fluid 5000mL 2 L/hr (06/13/24 0306)    propofol 15 mcg/kg/min (06/13/24 1000)       acetaminophen **OR** acetaminophen **OR** acetaminophen **OR** acetaminophen    dextrose 10%    lipase-protease-amylase (Lip-Prot-Amyl) **AND** sodium bicarbonate    dextrose    acetaminophen    ondansetron    metoclopramide    polyethylene glycol (PEG 3350)    sennosides    bisacodyl    glucose  **OR** glucose **OR** glucose-vitamin C **OR** dextrose **OR** glucose **OR** glucose **OR** glucose-vitamin C    hydrALAzine    LORazepam

## 2024-06-13 NOTE — PLAN OF CARE
Problem: RESPIRATORY - ADULT  Goal: Achieves optimal ventilation and oxygenation  Description: INTERVENTIONS:  - Assess for changes in respiratory status  - Assess for changes in mentation and behavior  - Position to facilitate oxygenation and minimize respiratory effort  - Oxygen supplementation based on oxygen saturation or ABGs  - Provide Smoking Cessation handout, if applicable  - Encourage broncho-pulmonary hygiene including cough, deep breathe, Incentive Spirometry  - Assess the need for suctioning and perform as needed  - Assess and instruct to report SOB or any respiratory difficulty  - Respiratory Therapy support as indicated  - Manage/alleviate anxiety  - Monitor for signs/symptoms of CO2 retention  Outcome: Progressing     Patient currently on following vent settings:   06/13/24 1130   Vent Information   Interface Invasive   Vent Type AV   Vent plugged into main power? Yes   Vent Mode VC/AC   Settings   FiO2 (%) 30 %   Resp Rate (Set) 14   Vt (Set, mL) 500 mL   Waveform Decelerating ramp   PEEP/CPAP (cm H2O) 5 cm H20      No changes or acute events during shift. ETT placement 7.0@21. RT continue to monitor. Not a candidate for SBT due to numerological status.

## 2024-06-13 NOTE — PROGRESS NOTES
[facility]  part of [facility]    Progress Note    Sarita Israel Patient Status:  Inpatient    1955 MRN [id number]   Location [facility] 2W/SW Attending Efraín Marquez MD   Hosp Day # 3 PCP No primary care provider on file.       Subjective:     Tolerating CRRT overnight.    Objective:   Vital Signs:  Blood pressure 115/44, pulse 62, temperature 98.3 °F (36.8 °C), temperature source Temporal, resp. rate 14, height 5' 6\" (1.676 m), weight 167 lb 12.3 oz (76.1 kg), SpO2 100%.  General: Intubated and non responsive  HEENT: ETT in place  Respiratory: Ventilatory breath sounds.  Cardiovascular: S1, S2.    Abdomen: Soft, nontender, nondistended.  Positive bowel sounds.  Ext: No LE edema  Neuro: intubated and non responsive    Results:     Lab Results   Component Value Date    WBC 13.4 (H) 2024    HGB 8.0 (L) 2024    HCT 23.8 (L) 2024    .0 (L) 2024    CREATSERUM 1.89 (H) 2024    BUN 14 2024     2024    K 4.3 2024     2024    CO2 27.0 2024    GLU 71 2024    CA 7.8 (L) 2024    ALB 2.5 (L) 2024    ALKPHO 108 2024    BILT <0.2 (L) 2024    TP 6.1 2024     (H) 2024     (H) 2024    TSH 3.670 2024    MG 1.7 2024    PHOS 2.1 (L) 2024     2024    B12 1,320 (H) 06/10/2024    ETOH <3 2024       XR CHEST AP PORTABLE  (CPT=71045)    Result Date: 2024  CONCLUSION:  1. Right IJ dialysis catheter tip at the RA SVC junction.  No pneumothorax. 2. CHF/fluid overload.  Left retrocardiac opacification may be related to compressive atelectasis from pleural effusion, or coexistent pneumonia.     Dictated by (CST): Reinier Tucker MD on 2024 at 5:17 PM     Finalized by (CST): Reinier Tucker MD on 2024 at 5:21 PM          CT BRAIN OR HEAD (76285)    Result Date: 2024  PROCEDURE: CT BRAIN OR HEAD (CPT=70450)   COMPARISON: [facility], CT BRAIN OR HEAD (CPT=70450), 6/10/2024, 0:25 AM.  INDICATIONS: anoxia  TECHNIQUE: CT images were obtained without contrast material.  Automated exposure control for dose reduction was used.  Dose information is transmitted to the ACR (American College of Radiology) NRDR (National Radiology Data Registry) which includes the Dose Index Registry.  Findings and impression:  No CT evidence of hypoxic brain injury  No hemorrhage or mass effect or edema  Normal midline ventricles with stable mild chronic ischemia in the periventricular and deep white matter    Dictated by (CST): Jaden Rivers MD on 6/11/2024 at 10:07 AM     Finalized by (CST): Jaden Rivers MD on 6/11/2024 at 10:09 AM                 Assessment and Plan:   69 year old female with PMH of CKD stage 4-5 with baseline creatinine around 3.0 in 9/2023,she is admitted now s/p cardiac arrest:     CORNEL on CKD:  - baseline Creatinine around 3.0 in 9/2023  - primary nephrologist Dr. Beena Cardenas  - Discussed with sister rodney, patient had refused RRT in the past, but patient had not specified in case of emergency if she would want RRT. Sister wanted to proceed with CRRT if indicated.  - Resume CRRT at this time for CORNEL on CKD and acidosis.  - I did discuss having a time limited trial given patients co morbidities and poor prognosis     Metabolic acidosis:  - From lactic acidosis and cardiac arrest  - CRRT as above.     Cardiac arrest:  - per primary and cardiology.     Anemia:  - from anemia of chronic disease and ABLA  - transfuse as per primary.     Shock:  - off vasopressors at this time.    AMS/anoxic brain injury:  - Per neurology.     Critical care time >35minutes.     Thank you for allowing me to participate in the care of your patient.     Jun Toney MD  [facility]  Nephrology

## 2024-06-13 NOTE — CM/SW NOTE
MRI today and 4pm then GOC discussion with family.  Hospice given a call about a possible need if family decides to move forward with a compassionate wean.    Cathy BOXA BSN RN CRRN   RN Case Manager  [phone]

## 2024-06-13 NOTE — PLAN OF CARE
2 gold bracelets were removed from the patient's wrist bilaterally at the request of the family and in preparation for the upcoming MRI. Endorsed to the oncoming nurse (Heidy RN) to give to the patient's family when they come back to the bedside.

## 2024-06-13 NOTE — RESPIRATORY THERAPY NOTE
Problem: RESPIRATORY - ADULT  Goal: Achieves optimal ventilation and oxygenation  Description: INTERVENTIONS:  - Assess for changes in respiratory status  - Assess for changes in mentation and behavior  - Position to facilitate oxygenation and minimize respiratory effort  - Oxygen supplementation based on oxygen saturation or ABGs  - Provide Smoking Cessation handout, if applicable  - Encourage broncho-pulmonary hygiene including cough, deep breathe, Incentive Spirometry  - Assess the need for suctioning and perform as needed  - Assess and instruct to report SOB or any respiratory difficulty  - Respiratory Therapy support as indicated  - Manage/alleviate anxiety  - Monitor for signs/symptoms of CO2 retention  Outcome: Progressing    Patient received on ventilator. Patient is a candidate for SBT. Bilateral breath sounds auscultated. Suction provided when indicated. No acute events. RT will continue to monitor.

## 2024-06-13 NOTE — PROGRESS NOTES
[facility] CARDIOLOGY Progress Note      Sarita Israel is a 69 year old female who I assessed as follows:  Chief Complaint   Patient presents with    Cardiac Arrest          REASON FOR CONSULTATION:    \"cardiac arrest\"            ASSESSMENT/PLAN:     IMPRESSIONS:  Cardiac arrest, may very well be related to metabolic issues from worsening renal failure and sepsis. Rhythm has been stable  Hypotension/shock, now off pressors  Elevated troponin  Acute resp failure, intubated  HTN  DM  anoxic encephalopathy. EEG with poor prognosis  Sepsis/Enterococcus faecium bacteremia   CORNEL on CKD        PLAN:  Tele reviewed  CRRT   Await to see if neurologic recovery. MRI brain   Poor prognosis        Critical care time: 35 minutes        Subjective:    Intubated. Sedated.    --------------------------------------------------------------------------------------------------------------------------------    HPI:         History of DM, HTN presents with not feeling well. Family was to bring patient to hospital but she arrested. 911 called. Asystole with paramedics; ACLS performed. In ER, given epi, the DARION with pulse. Then had bradycardia, given atropine. Seizure like activity in ER. Severely acidotic.    Was recently told that creat in 7s and she refused hemodialysis.    Currently pateint is intubated, sedated (no purposeful response when off sedation).             No current outpatient medications on file.      No past medical history on file.  No past surgical history on file.  No family history on file.   Social History:  Social History     Socioeconomic History    Marital status: Single     Social Determinants of Health     Food Insecurity: Unknown (6/10/2024)    Food Insecurity     Food Insecurity: Patient unable to answer   Transportation Needs: Unknown (6/10/2024)    Transportation Needs     Lack of Transportation: Patient unable to answer   Housing Stability: Unknown (6/10/2024)    Housing Stability     Housing  Instability: Patient unable to answer          Medications:  Current Facility-Administered Medications   Medication Dose Route Frequency    ampicillin (Omnipen) 2 g in sodium chloride 0.9% 100 mL IVPB-MBP  2 g Intravenous Q8H    dextrose 10% infusion (TPN no rate)   Intravenous Continuous PRN    pancrelipase (Lip-Prot-Amyl) (Zenpep) DR particles cap 10,000 Units  10,000 Units Per G Tube PRN    And    sodium bicarbonate tab 325 mg  325 mg Oral PRN    dextrose 5% infusion   Intravenous Daily PRN    [Held by provider] heparin (Porcine) 5000 UNIT/ML injection 5,000 Units  5,000 Units Subcutaneous Q8H SHOAIB    acetaminophen (Tylenol Extra Strength) tab 500 mg  500 mg Oral Q4H PRN    ondansetron (Zofran) 4 MG/2ML injection 4 mg  4 mg Intravenous Q6H PRN    metoclopramide (Reglan) 5 mg/mL injection 5 mg  5 mg Intravenous Q8H PRN    polyethylene glycol (PEG 3350) (Miralax) 17 g oral packet 17 g  17 g Oral Daily PRN    sennosides (Senokot) tab 17.2 mg  17.2 mg Oral Nightly PRN    bisacodyl (Dulcolax) 10 MG rectal suppository 10 mg  10 mg Rectal Daily PRN    glucose (Dex4) 15 GM/59ML oral liquid 15 g  15 g Oral Q15 Min PRN    Or    glucose (Glutose) 40% oral gel 15 g  15 g Oral Q15 Min PRN    Or    glucose-vitamin C (Dex-4) chewable tab 4 tablet  4 tablet Oral Q15 Min PRN    Or    dextrose 50% injection 50 mL  50 mL Intravenous Q15 Min PRN    Or    glucose (Dex4) 15 GM/59ML oral liquid 30 g  30 g Oral Q15 Min PRN    Or    glucose (Glutose) 40% oral gel 30 g  30 g Oral Q15 Min PRN    Or    glucose-vitamin C (Dex-4) chewable tab 8 tablet  8 tablet Oral Q15 Min PRN    insulin regular human (Novolin R, Humulin R) 100 UNIT/ML injection 1-7 Units  1-7 Units Subcutaneous 4 times per day    norepinephrine (Levophed) 4 mg/250mL infusion premix  0.5-30 mcg/min Intravenous Continuous    pantoprazole (Protonix) 40 mg in sodium chloride 0.9% PF 10 mL IV push  40 mg Intravenous Q12H    insulin degludec 100 units/mL flextouch 8 Units  8 Units  Subcutaneous Daily    NxStage Pure Flow 401 CRRT/PIRRT fluid 5000mL  2 L/hr CRRT Continuous    hydrALAzine (Apresoline) 20 mg/mL injection 10 mg  10 mg Intravenous Q6H PRN    LORazepam (Ativan) 2 mg/mL injection 2 mg  2 mg Intravenous Q30 Min PRN    levETIRAcetam (Keppra) 500 mg/5mL injection 500 mg  500 mg Intravenous Q24H    propofol (Diprivan) 10 mg/mL infusion premix  5-50 mcg/kg/min Intravenous Continuous           Allergies  No Known Allergies            REVIEW OF SYSTEMS:   Patient unable to answer          EXAM:         TELE: SR          /44 (BP Location: Right arm)   Pulse 72   Temp 98.4 °F (36.9 °C) (Temporal)   Resp 14   Ht 5' 6\" (1.676 m)   Wt 167 lb 12.3 oz (76.1 kg)   SpO2 100%   BMI 27.08 kg/m²   Temp (24hrs), Av.9 °F (36.1 °C), Min:95.6 °F (35.3 °C), Max:98.4 °F (36.9 °C)    Wt Readings from Last 3 Encounters:   24 167 lb 12.3 oz (76.1 kg)         Intake/Output Summary (Last 24 hours) at 2024 0627  Last data filed at 2024 0600  Gross per 24 hour   Intake 1047.1 ml   Output 1444 ml   Net -396.9 ml         GENERAL:intubated  SKIN: no rashes  HEENT: atraumatic, normocephalic, throat without erythema  NECK: supple, no bruits  LUNGS: coarse breath sounds  CARDIO: RRR without murmur or S3   GI: soft, nontender  EXTREMITIES: no edema  NEUROLOGY: not alert  PSYCH: cooperative          LABORATORY DATA:               ECG:         ECHO:          Labs:  Recent Labs   Lab 24  0810 24  1544 24  0343   GLU 83  84 82 71   BUN 22  22 17 14   CREATSERUM 2.83*  2.84* 2.29* 1.89*   CA 7.5*  7.6* 8.3* 7.8*     139 140 139   K 4.0  4.1 4.4 4.3     108 107 107   CO2 26.0  27.0 25.0 27.0     No results for input(s): \"TROP\" in the last 168 hours.  Recent Labs   Lab 24  0343   RBC 2.79*   HGB 8.0*   HCT 23.8*   MCV 85.3   MCH 28.7   MCHC 33.6   RDW 15.1*   NEPRELIM 11.10*   WBC 13.4*   .0*     Recent Labs   Lab 24  2257 24  0345    TROPHS 347* 609*     --        Imaging:  XR CHEST AP PORTABLE  (CPT=71045)    Result Date: 6/11/2024  CONCLUSION:  1. Right IJ dialysis catheter tip at the RA SVC junction.  No pneumothorax. 2. CHF/fluid overload.  Left retrocardiac opacification may be related to compressive atelectasis from pleural effusion, or coexistent pneumonia.     Dictated by (CST): Reinier Tucker MD on 6/11/2024 at 5:17 PM     Finalized by (CST): Reinier Tucker MD on 6/11/2024 at 5:21 PM          CT BRAIN OR HEAD ([id number])    Result Date: 6/11/2024  PROCEDURE: CT BRAIN OR HEAD (CPT=70450)  COMPARISON: [facility], CT BRAIN OR HEAD (CPT=70450), 6/10/2024, 0:25 AM.  INDICATIONS: anoxia  TECHNIQUE: CT images were obtained without contrast material.  Automated exposure control for dose reduction was used.  Dose information is transmitted to the ACR (American College of Radiology) NRDR (National Radiology Data Registry) which includes the Dose Index Registry.  Findings and impression:  No CT evidence of hypoxic brain injury  No hemorrhage or mass effect or edema  Normal midline ventricles with stable mild chronic ischemia in the periventricular and deep white matter    Dictated by (CST): Jaden Rivers MD on 6/11/2024 at 10:07 AM     Finalized by (CST): Jaden Rivers MD on 6/11/2024 at 10:09 AM          XR CHEST AP PORTABLE  (CPT=71045)    Result Date: 6/10/2024  CONCLUSION:  1. Cardiomegaly.  Tortuous aorta. 2. Bilateral mixed alveolar and interstitial multifocal airspace opacification confluent lung consolidation more pronounced in the right perihilar region with slight improvement at the apex. 3. Persistent left basilar atelectatic changes and/or lung consolidation.  4. Moderate right-sided effusion has increased    Dictated by (CST): Tex Shah MD on 6/10/2024 at 10:23 AM     Finalized by (CST): Tex Shah MD on 6/10/2024 at 10:28 AM                Lazarus Winn MD

## 2024-06-13 NOTE — PROGRESS NOTES
Pt gold braclets given to sister alesha in front of other sister. Night rn was able to remove them.

## 2024-06-13 NOTE — PLAN OF CARE
Problem: Patient Centered Care  Goal: Patient preferences are identified and integrated in the patient's plan of care  Description: Interventions:  - What would you like us to know as we care for you? Unable to assess-Sarita is intubated and sedated at this time.   - Provide timely, complete, and accurate information to patient/family  - Incorporate patient and family knowledge, values, beliefs, and cultural backgrounds into the planning and delivery of care  - Encourage patient/family to participate in care and decision-making at the level they choose  - Honor patient and family perspectives and choices  Outcome: Progressing     Problem: Diabetes/Glucose Control  Goal: Glucose maintained within prescribed range  Description: INTERVENTIONS:  - Monitor Blood Glucose as ordered  - Assess for signs and symptoms of hyperglycemia and hypoglycemia  - Administer ordered medications to maintain glucose within target range  - Assess barriers to adequate nutritional intake and initiate nutrition consult as needed  - Instruct patient on self management of diabetes  Outcome: Progressing     Problem: Patient/Family Goals  Goal: Patient/Family Long Term Goal  Description: Patient's Long Term Goal: Unable to assess-Sarita is intubated and sedated at this time.     Interventions:  - Will reassess when applicable   - See additional Care Plan goals for specific interventions  Outcome: Progressing  Goal: Patient/Family Short Term Goal  Description: Patient's Short Term Goal: Unable to assess-Sarita is intubated and sedated at this time.     Interventions:   - Will reassess when applicable   - See additional Care Plan goals for specific interventions  Outcome: Progressing     Problem: Delirium  Goal: Minimize duration of delirium  Description: Interventions:  - Encourage use of hearing aids, eye glasses  - Promote highest level of mobility daily  - Provide frequent reorientation  - Promote wakefulness i.e. lights on, blinds  open  - Promote sleep, encourage patient's normal rest cycle i.e. lights off, TV off, minimize noise and interruptions  - Encourage family to assist in orientation and promotion of home routines  Outcome: Progressing     Problem: RESPIRATORY - ADULT  Goal: Achieves optimal ventilation and oxygenation  Description: INTERVENTIONS:  - Assess for changes in respiratory status  - Assess for changes in mentation and behavior  - Position to facilitate oxygenation and minimize respiratory effort  - Oxygen supplementation based on oxygen saturation or ABGs  - Provide Smoking Cessation handout, if applicable  - Encourage broncho-pulmonary hygiene including cough, deep breathe, Incentive Spirometry  - Assess the need for suctioning and perform as needed  - Assess and instruct to report SOB or any respiratory difficulty  - Respiratory Therapy support as indicated  - Manage/alleviate anxiety  - Monitor for signs/symptoms of CO2 retention  Outcome: Progressing     Problem: CARDIOVASCULAR - ADULT  Goal: Maintains optimal cardiac output and hemodynamic stability  Description: INTERVENTIONS:  - Monitor vital signs, rhythm, and trends  - Monitor for bleeding, hypotension and signs of decreased cardiac output  - Evaluate effectiveness of vasoactive medications to optimize hemodynamic stability  - Monitor arterial and/or venous puncture sites for bleeding and/or hematoma  - Assess quality of pulses, skin color and temperature  - Assess for signs of decreased coronary artery perfusion - ex. Angina  - Evaluate fluid balance, assess for edema, trend weights  Outcome: Progressing  Goal: Absence of cardiac arrhythmias or at baseline  Description: INTERVENTIONS:  - Continuous cardiac monitoring, monitor vital signs, obtain 12 lead EKG if indicated  - Evaluate effectiveness of antiarrhythmic and heart rate control medications as ordered  - Initiate emergency measures for life threatening arrhythmias  - Monitor electrolytes and administer  replacement therapy as ordered  Outcome: Progressing

## 2024-06-13 NOTE — PROGRESS NOTES
Critical Care Progress Note     Assessment / Plan:  Acute respiratory failure - secondary to poor MS and cardiac arrest.   - continue full vent support  Cardiac arrest - likely due to hyperK and acidemia in the setting of sepsis  - normothermia protocol  - RRT as below  - antibiotics as below  Shock  - off vasopressors  - antibiotics  Sepsis - blood culture with Enterococcus faecium   - ampicillin  Encephalopathy - concern for anoxic encephalopathy  - MRI brain pending  - per neurology  CORNEL on CKD, hyperkalemia  - RRT per nephrology  Coffee ground output from OGT  - PPI  Anemia  - transfuse to keep hgb >7  DM  - degludec  - SSI  FEN  - NPO  Ppx  - SCDs  - PPI  Dispo  - DNAR/full  - poor prognosis discussed with family. Noted neuro prognostication     Critical care time: 35 minutes      Subjective:  No events overnight    Objective:  Vitals:    06/13/24 1200 06/13/24 1300 06/13/24 1400 06/13/24 1500   BP: 124/47 123/50 127/49 119/54   BP Location:  Right arm     Pulse: 62 63 63 64   Resp: 14 14 14 14   Temp: 97.6 °F (36.4 °C)      TempSrc: Temporal      SpO2: 99% 99% 98% 98%   Weight:       Height:         Physical Exam:  General: intubated  Skin: no rash, ulcers or subcutaneous nodules  Eyes: anicteric sclerae, moist conjunctivae  Head, ears, nose, throat: atraumatic, oropharynx clear with moist mucous membranes  Neck: trachea midline with no thyromegaly  Heart: regular rate and rhythm, no murmurs / rubs / gallops  Lungs: clear bilaterally  Abdomen: soft, nontender, nondistended   Extremities: no edema or cyanosis  Psych: unresponsive    Medications:  Reviewed in EMR    Lab Data:  Reviewed in EMR    Imaging:  I independently visualized all relevant chest imaging in PACS and agree with radiology interpretation except where noted.

## 2024-06-13 NOTE — PLAN OF CARE
Problem: Patient Centered Care  Goal: Patient preferences are identified and integrated in the patient's plan of care  Description: Interventions:  - What would you like us to know as we care for you?   - Provide timely, complete, and accurate information to patient/family  - Incorporate patient and family knowledge, values, beliefs, and cultural backgrounds into the planning and delivery of care  - Encourage patient/family to participate in care and decision-making at the level they choose  - Honor patient and family perspectives and choices  Outcome: Progressing     Problem: Diabetes/Glucose Control  Goal: Glucose maintained within prescribed range  Description: INTERVENTIONS:  - Monitor Blood Glucose as ordered  - Assess for signs and symptoms of hyperglycemia and hypoglycemia  - Administer ordered medications to maintain glucose within target range  - Assess barriers to adequate nutritional intake and initiate nutrition consult as needed  - Instruct patient on self management of diabetes  Outcome: Progressing     Problem: Patient/Family Goals  Goal: Patient/Family Long Term Goal  Description: Patient's Long Term Goal:     Interventions:  -   - See additional Care Plan goals for specific interventions  Outcome: Progressing  Goal: Patient/Family Short Term Goal  Description: Patient's Short Term Goal:     Interventions:   -   - See additional Care Plan goals for specific interventions  Outcome: Progressing     Problem: Delirium  Goal: Minimize duration of delirium  Description: Interventions:  - Encourage use of hearing aids, eye glasses  - Promote highest level of mobility daily  - Provide frequent reorientation  - Promote wakefulness i.e. lights on, blinds open  - Promote sleep, encourage patient's normal rest cycle i.e. lights off, TV off, minimize noise and interruptions  - Encourage family to assist in orientation and promotion of home routines  Outcome: Progressing     Problem: RESPIRATORY - ADULT  Goal:  Achieves optimal ventilation and oxygenation  Description: INTERVENTIONS:  - Assess for changes in respiratory status  - Assess for changes in mentation and behavior  - Position to facilitate oxygenation and minimize respiratory effort  - Oxygen supplementation based on oxygen saturation or ABGs  - Provide Smoking Cessation handout, if applicable  - Encourage broncho-pulmonary hygiene including cough, deep breathe, Incentive Spirometry  - Assess the need for suctioning and perform as needed  - Assess and instruct to report SOB or any respiratory difficulty  - Respiratory Therapy support as indicated  - Manage/alleviate anxiety  - Monitor for signs/symptoms of CO2 retention  Outcome: Progressing

## 2024-06-14 NOTE — PROGRESS NOTES
[facility] updated regarding pt's MRI brain results. Per [facility] representative, notify ISABELL with time of death should family decide to change code status.     Reference #: [id number]

## 2024-06-14 NOTE — PROGRESS NOTES
[facility]  part of [facility]    Progress Note    Sarita Israel Patient Status:  Inpatient    1955 MRN [id number]   Location [facility] 2W/SW Attending Efraín Marquez MD   Hosp Day # 4 PCP No primary care provider on file.       Subjective:     Tolerating CRRT overnight.    Objective:   Vital Signs:  Blood pressure 107/50, pulse 65, temperature 97.1 °F (36.2 °C), temperature source Temporal, resp. rate 14, height 5' 6\" (1.676 m), weight 177 lb 11.1 oz (80.6 kg), SpO2 100%.  General: Intubated and non responsive  HEENT: ETT in place  Respiratory: Ventilatory breath sounds.  Cardiovascular: S1, S2.    Abdomen: Soft, nontender, nondistended.  Positive bowel sounds.  Ext: No LE edema  Neuro: intubated and non responsive    Results:     Lab Results   Component Value Date    WBC 13.2 (H) 2024    HGB 7.7 (L) 2024    HCT 23.3 (L) 2024    PLT 91.0 (L) 2024    CREATSERUM 2.45 (H) 2024    BUN 19 2024     (L) 2024    K 4.1 2024     2024    CO2 24.0 2024     (H) 2024    CA 7.5 (L) 2024    ALB 2.2 (L) 2024    ALKPHO 108 2024    BILT <0.2 (L) 2024    TP 6.1 2024     (H) 2024     (H) 2024    TSH 3.670 2024    MG 1.6 2024    PHOS 2.8 2024     2024    B12 1,320 (H) 06/10/2024    ETOH <3 2024       MRI BRAIN (CPT=70551)    Result Date: 2024  CONCLUSION:  1. Findings most compatible with global anoxic injury throughout the cerebral hemispheres bilaterally. 2. Multifocal early subacute lacunar infarctions throughout the cerebellar hemispheres bilaterally, with milder involvement of the left thalamus and left hemipons. 3. Lesser incidental findings as above.   A preliminary report was issued by the [facility] Radiology teleradiology service. There are no major discrepancies.  [facility]remote  Dictated by (CST): Krzysztof Rene MD on  6/14/2024 at 6:40 AM     Finalized by (CST): Krzysztof Rene MD on 6/14/2024 at 6:49 AM                 Assessment and Plan:   69 year old female with PMH of CKD stage 4-5 with baseline creatinine around 3.0 in 9/2023,she is admitted now s/p cardiac arrest:     CORNEL on CKD:  - baseline Creatinine around 3.0 in 9/2023  - primary nephrologist Dr. Beena Cardenas  - Resume CRRT pending GOC conversation with family.  - MRI brain results noted.   - GOC per primary.     Metabolic acidosis:  - From lactic acidosis and cardiac arrest  - CRRT as above.     Cardiac arrest:  - per primary and cardiology.     Anemia:  - from anemia of chronic disease and ABLA  - transfuse as per primary.     Shock:  - off vasopressors at this time.    AMS/anoxic brain injury:  - Per neurology.     Thank you for allowing me to participate in the care of your patient.     Jun Toney MD  [facility]  Nephrology

## 2024-06-14 NOTE — PROGRESS NOTES
[facility] Hospitalist Progress Note     CC: Hospital Follow up    PCP: No primary care provider on file.       Assessment/Plan:   Ms. Israel is a 69 year old female with PMH sig for CKD stage 4-5, HTN, DM, who presented as cardiac arrest.       Cardiac arrest   Shock   - etiology likely due to hyperkalemia and acidosis, sepsis   - unclear how long patient was down Prior to CPR, likely more than 30 minutes per family  - now off pressors  - previously declined HD, now on CRRT but now off   - neurologic assessment---EEG and MRI completed, consistent with anoxic brain injury     Acute respiratory failure  - intubated  - pulm consulted, vent management per pulm    Sepsis  Enterococcus faecium bacteremia   -antibiotics narrowed to ampicillin IV given culture sensitivity     CORNEL  Metabolic acidosis   CKD stage 4-5  - likely from renal faliure and re-existing renal failure  - nephrology consulted     encephalopathy  Myoclonus  - anoxic related  - EEG concerning for severe hypoxic encephalopathy      HTN  -chronic     DM  - SSI and accuchecks  - D5W if sugars low    Coffee ground emesis?, appears resolved  -IV PPI  -PRBC given on 6/12     GOC:  - patient next of kin is her sister eusebio and her brother  - DNR     DVT Prophy:scd    Code status: DNR    Likely plan for terminal wean I would suspect today. Family to arrive later this morning. Hospice involved.     Vidhya Gutierrez [facility] Hospitalist      Subjective:     Intubated, sedated.     OBJECTIVE:    Blood pressure 107/50, pulse 65, temperature 97.1 °F (36.2 °C), temperature source Temporal, resp. rate 14, height 5' 6\" (1.676 m), weight 177 lb 11.1 oz (80.6 kg), SpO2 100%.    Temp:  [96.2 °F (35.7 °C)-97.9 °F (36.6 °C)] 97.1 °F (36.2 °C)  Pulse:  [59-72] 65  Resp:  [14] 14  BP: (101-137)/(42-56) 107/50  SpO2:  [91 %-100 %] 100 %  FiO2 (%):  [30 %] 30 %      Intake/Output:    Intake/Output Summary (Last 24 hours) at 6/14/2024 8672  Last data filed  at 6/14/2024 0700  Gross per 24 hour   Intake 1573.2 ml   Output 416 ml   Net 1157.2 ml       Last 3 Weights   06/14/24 0400 177 lb 11.1 oz (80.6 kg)   06/13/24 0600 167 lb 12.3 oz (76.1 kg)   06/12/24 0156 169 lb 1.5 oz (76.7 kg)   06/11/24 0247 171 lb 11.8 oz (77.9 kg)   06/10/24 0335 165 lb 5.5 oz (75 kg)   06/09/24 2322 200 lb (90.7 kg)       /50   Pulse 65   Temp 97.1 °F (36.2 °C) (Temporal)   Resp 14   Ht 5' 6\" (1.676 m)   Wt 177 lb 11.1 oz (80.6 kg)   SpO2 100%   BMI 28.68 kg/m²   General: intubated sedated  Lungs: clear to ausculation bilaterally  Heart: Regular rate and rhythm  Abdomen: soft, non tender  Extremities: No edema    Data Review:       Labs:     Recent Labs   Lab 06/12/24  0810 06/12/24  1544 06/13/24  0343 06/14/24  0327   RBC 2.29*  --  2.79* 2.72*   HGB 6.5* 8.7* 8.0* 7.7*   HCT 19.5* 25.9* 23.8* 23.3*   MCV 85.2  --  85.3 85.7   MCH 28.4  --  28.7 28.3   MCHC 33.3  --  33.6 33.0   RDW 15.8*  --  15.1* 15.7*   NEPRELIM 9.98*  --  11.10* 11.11*   WBC 12.3*  --  13.4* 13.2*   PLT 82.0*  --  101.0* 91.0*         Recent Labs   Lab 06/13/24  1732 06/14/24  0327 06/14/24  0832   * 126* 134*   BUN 15 19 18   CREATSERUM 2.07* 2.45* 2.30*   EGFRCR 25* 21* 22*   CA 7.7* 7.5* 7.4*   * 135* 136   K 4.0 4.1 4.2    105 107   CO2 26.0 24.0 25.0       Recent Labs   Lab 06/09/24  2257 06/10/24  0309 06/11/24  0340 06/12/24  0810 06/13/24  0343 06/14/24  0327   *  --   --   --   --   --    *  --   --   --   --   --    ALB 3.3 2.9* 2.7* 2.4* 2.5* 2.2*         Imaging:  MRI BRAIN (CPT=70551)    Result Date: 6/14/2024  CONCLUSION:  1. Findings most compatible with global anoxic injury throughout the cerebral hemispheres bilaterally. 2. Multifocal early subacute lacunar infarctions throughout the cerebellar hemispheres bilaterally, with milder involvement of the left thalamus and left hemipons. 3. Lesser incidental findings as above.   A preliminary report was issued  by the [facility] Radiology teleradiology service. There are no major discrepancies.  [facility]  Dictated by (CST): Krzysztof Rene MD on 6/14/2024 at 6:40 AM     Finalized by (CST): Krzysztof Rene MD on 6/14/2024 at 6:49 AM          XR CHEST AP PORTABLE  (CPT=71045)    Result Date: 6/11/2024  CONCLUSION:  1. Right IJ dialysis catheter tip at the RA SVC junction.  No pneumothorax. 2. CHF/fluid overload.  Left retrocardiac opacification may be related to compressive atelectasis from pleural effusion, or coexistent pneumonia.     Dictated by (CST): Reinier Tucker MD on 6/11/2024 at 5:17 PM     Finalized by (CST): Reinier Tucker MD on 6/11/2024 at 5:21 PM             Meds:      chlorhexidine gluconate  15 mL Mouth/Throat BID@0800,2000    ampicillin  2 g Intravenous Q8H    [Held by provider] heparin  5,000 Units Subcutaneous Q8H SHOAIB    insulin regular human  1-7 Units Subcutaneous 4 times per day    pantoprazole  40 mg Intravenous Q12H    insulin degludec  8 Units Subcutaneous Daily    levETIRAcetam  500 mg Intravenous Q24H      dextrose 10%      norepinephrine Stopped (06/10/24 0347)    NxStage Pure Flow 401 CRRT/PIRRT fluid 5000mL 2 L/hr (06/13/24 0552)    propofol 15 mcg/kg/min (06/14/24 0923)       acetaminophen **OR** acetaminophen **OR** acetaminophen **OR** acetaminophen    dextrose 10%    lipase-protease-amylase (Lip-Prot-Amyl) **AND** sodium bicarbonate    dextrose    ondansetron    metoclopramide    polyethylene glycol (PEG 3350)    sennosides    bisacodyl    glucose **OR** glucose **OR** glucose-vitamin C **OR** dextrose **OR** glucose **OR** glucose **OR** glucose-vitamin C    hydrALAzine    LORazepam

## 2024-06-14 NOTE — PROGRESS NOTES
Critical Care Progress Note     Assessment / Plan:  Acute respiratory failure - secondary to poor MS and cardiac arrest.   - continue full vent support  Cardiac arrest - likely due to hyperK and acidemia in the setting of sepsis  - RRT as below  - antibiotics as below  Shock  - off vasopressors  - antibiotics  Sepsis - blood culture with Enterococcus faecium   - ampicillin  Encephalopathy - MRI consistent with    - MRI brain pending  - per neurology  CORNEL on CKD, hyperkalemia  - RRT per nephrology  Coffee ground output from OGT  - PPI  Anemia  - transfuse to keep hgb >7  DM  - degludec  - SSI  FEN  - NPO  Ppx  - SCDs  - PPI  Dispo  - DNAR/full  - poor prognosis discussed with family. Noted neuro prognostication     Critical care time: 35 minutes      Subjective:  No events overnight    Objective:  Vitals:    06/14/24 0400 06/14/24 0500 06/14/24 0600 06/14/24 0700   BP: 124/49 113/50 103/43 101/47   BP Location: Right arm Right arm Right arm Right arm   Pulse: 62 59 61 61   Resp: 14 14 14 14   Temp: (!) 96.2 °F (35.7 °C)   96.7 °F (35.9 °C)   TempSrc: Temporal   Temporal   SpO2: 100% 100% 100% 100%   Weight: 177 lb 11.1 oz (80.6 kg)      Height:         Physical Exam:  General: intubated  Skin: no rash, ulcers or subcutaneous nodules  Eyes: anicteric sclerae, moist conjunctivae  Head, ears, nose, throat: atraumatic, oropharynx clear with moist mucous membranes  Neck: trachea midline with no thyromegaly  Heart: regular rate and rhythm, no murmurs / rubs / gallops  Lungs: clear bilaterally  Abdomen: soft, nontender, nondistended   Extremities: no edema or cyanosis  Psych: unresponsive    Medications:  Reviewed in EMR    Lab Data:  Reviewed in EMR    Imaging:  I independently visualized all relevant chest imaging in PACS and agree with radiology interpretation except where noted.

## 2024-06-14 NOTE — RESPIRATORY THERAPY NOTE
RESPIRATORY THERAPY PATIENT TRANSPORT NOTE    Transported from: MRI  Transported to: CCU room 202    Patient is intubated?:yes  Transport ventilator used?:yes  Resuscitation bag used during transport?:no   ETT placement and ventilator function were verified post-transport. yes     RT will continue to monitor.

## 2024-06-14 NOTE — PLAN OF CARE
Problem: RESPIRATORY - ADULT  Goal: Achieves optimal ventilation and oxygenation  Description: INTERVENTIONS:  - Assess for changes in respiratory status  - Assess for changes in mentation and behavior  - Position to facilitate oxygenation and minimize respiratory effort  - Oxygen supplementation based on oxygen saturation or ABGs  - Provide Smoking Cessation handout, if applicable  - Encourage broncho-pulmonary hygiene including cough, deep breathe, Incentive Spirometry  - Assess the need for suctioning and perform as needed  - Assess and instruct to report SOB or any respiratory difficulty  - Respiratory Therapy support as indicated  - Manage/alleviate anxiety  - Monitor for signs/symptoms of CO2 retention  Outcome: Progressing         06/14/24 1122   Vent Information   Interface Invasive   Vent Type AV   Vent plugged into main power? Yes   Vent Mode VC/AC   Settings   FiO2 (%) 30 %   Resp Rate (Set) 14   Vt (Set, mL) 500 mL   Waveform Decelerating ramp   PEEP/CPAP (cm H2O) 5 cm H20   No changes or acute events during shift. ETT placement 7.0@21. RT continue to monitor. Not a candidate for SBT due to numerological status.

## 2024-06-14 NOTE — PLAN OF CARE
Pt is not following cammands. Jerking seizure like activity when suctioning or turning. Prn ativan given. She remains on 15 mics of propofol.  + weak gag, + weak cough sister Susy in to see pt close to 1600. She stated more family members will be here later between 6 and 7 and they will decide plan of care later on.  Sinus r.  Scd's are on.  Turning  pt every 2 hrs. Freq checks to ensure she is clean dry and intact. simpson cathetor care completed. CHG bath care completed. Crrt stopped in am. Family updated that we have  another order to restart crrt dialysis, ([place]) [facility] is aware.updated of this will start when they have staffing.  [facility] reference [id number]. Pt is a potential donor for organ and cornea.  I  called [facility] back to updated them.I spoke with guillermo with [facility]. She stated to call back on time of death. Much emotinonal support. Set up room for family to see pt.  Patient Centered Care  Goal: Patient preferences are identified and integrated in the patient's plan of care  Description: Interventions:  - What would you like us to know as we care for you?  - Provide timely, complete, and accurate information to patient/family  - Incorporate patient and family knowledge, values, beliefs, and cultural backgrounds into the planning and delivery of care  - Encourage patient/family to participate in care and decision-making at the level they choose  - Honor patient and family perspectives and choices  Outcome: Not Progressing     Problem: Diabetes/Glucose Control  Goal: Glucose maintained within prescribed range  Description: INTERVENTIONS:  - Monitor Blood Glucose as ordered  - Assess for signs and symptoms of hyperglycemia and hypoglycemia  - Administer ordered medications to maintain glucose within target range  - Assess barriers to adequate nutritional intake and initiate nutrition consult as needed  - Instruct patient on self management of diabetes  Outcome: Not Progressing      Problem: Patient/Family Goals  Goal: Patient/Family Long Term Goal  Description: Patient's Long Term Goal:    Interventions:  - See additional Care Plan goals for specific interventions  Outcome: Not Progressing  Goal: Patient/Family Short Term Goal  Description: Patient's Short Term Goal:    Interventions:     - See additional Care Plan goals for specific interventions  Outcome: Not Progressing     Problem: Delirium  Goal: Minimize duration of delirium  Description: Interventions:  - Encourage use of hearing aids, eye glasses  - Promote highest level of mobility daily  - Provide frequent reorientation  - Promote wakefulness i.e. lights on, blinds open  - Promote sleep, encourage patient's normal rest cycle i.e. lights off, TV off, minimize noise and interruptions  - Encourage family to assist in orientation and promotion of home routines  Outcome: Not Progressing     Problem: RESPIRATORY - ADULT  Goal: Achieves optimal ventilation and oxygenation  Description: INTERVENTIONS:  - Assess for changes in respiratory status  - Assess for changes in mentation and behavior  - Position to facilitate oxygenation and minimize respiratory effort  - Oxygen supplementation based on oxygen saturation or ABGs  - Provide Smoking Cessation handout, if applicable  - Encourage broncho-pulmonary hygiene including cough, deep breathe, Incentive Spirometry  - Assess the need for suctioning and perform as needed  - Assess and instruct to report SOB or any respiratory difficulty  - Respiratory Therapy support as indicated  - Manage/alleviate anxiety  - Monitor for signs/symptoms of CO2 retention  Outcome: Not Progressing     Problem: CARDIOVASCULAR - ADULT  Goal: Maintains optimal cardiac output and hemodynamic stability  Description: INTERVENTIONS:  - Monitor vital signs, rhythm, and trends  - Monitor for bleeding, hypotension and signs of decreased cardiac output  - Evaluate effectiveness of vasoactive medications to optimize  hemodynamic stability  - Monitor arterial and/or venous puncture sites for bleeding and/or hematoma  - Assess quality of pulses, skin color and temperature  - Assess for signs of decreased coronary artery perfusion - ex. Angina  - Evaluate fluid balance, assess for edema, trend weights  Outcome: Not Progressing  Goal: Absence of cardiac arrhythmias or at baseline  Description: INTERVENTIONS:  - Continuous cardiac monitoring, monitor vital signs, obtain 12 lead EKG if indicated  - Evaluate effectiveness of antiarrhythmic and heart rate control medications as ordered  - Initiate emergency measures for life threatening arrhythmias  - Monitor electrolytes and administer replacement therapy as ordered  Outcome: Not Progressing     Problem: NEUROLOGICAL - ADULT  Goal: Achieves stable or improved neurological status  Description: INTERVENTIONS  - Assess for and report changes in neurological status  - Initiate measures to prevent increased intracranial pressure  - Maintain blood pressure and fluid volume within ordered parameters to optimize cerebral perfusion and minimize risk of hemorrhage  - Monitor temperature, glucose, and sodium. Initiate appropriate interventions as ordered  Outcome: Not Progressing  Goal: Absence of seizures  Description: INTERVENTIONS  - Monitor for seizure activity  - Administer anti-seizure medications as ordered  - Monitor neurological status  Outcome: Not Progressing

## 2024-06-14 NOTE — PROGRESS NOTES
[facility]  [address] [place] [address], [address]  [facility] [address]  [phone]            Sarita Israel Patient Status:  Inpatient    1955 MRN [id number]   Location [facility] 2W/SW Attending Efraín Marquez MD   Hosp Day # 4 PCP No primary care provider on file.     Subjective:  Sarita Israel is a(n) 69 year old female.    Hospital course to date:     Patient with a history of CKD, hypertension, diabetes, she was not feeling well but then became acutely unresponsive.  Found to be in asystole return explained circulation was achieved and EEG.  EEG showed burst suppression even off of propofol.  She was having stimulus induced myoclonus as well.  Off sedation twitching would come back.  She patient was treated with [facility]    Current Facility-Administered Medications   Medication Dose Route Frequency    acetaminophen (Tylenol) tab 650 mg  650 mg Oral Q4H PRN    Or    acetaminophen (Tylenol) 160 MG/5ML oral liquid 650 mg  650 mg Oral Q4H PRN    Or    acetaminophen (Tylenol) rectal suppository 650 mg  650 mg Rectal Q4H PRN    Or    acetaminophen (Ofirmev) 10 mg/mL infusion premix 1,000 mg  1,000 mg Intravenous Q6H PRN    chlorhexidine gluconate (Peridex) 0.12 % oral solution 15 mL  15 mL Mouth/Throat BID@0800,2000    ampicillin (Omnipen) 2 g in sodium chloride 0.9% 100 mL IVPB-MBP  2 g Intravenous Q8H    dextrose 10% infusion (TPN no rate)   Intravenous Continuous PRN    pancrelipase (Lip-Prot-Amyl) (Zenpep) DR particles cap 10,000 Units  10,000 Units Per G Tube PRN    And    sodium bicarbonate tab 325 mg  325 mg Oral PRN    dextrose 5% infusion   Intravenous Daily PRN    [Held by provider] heparin (Porcine) 5000 UNIT/ML injection 5,000 Units  5,000 Units Subcutaneous Q8H SHOAIB    ondansetron (Zofran) 4 MG/2ML injection 4 mg  4 mg Intravenous Q6H PRN    metoclopramide (Reglan) 5 mg/mL injection 5 mg  5 mg Intravenous Q8H PRN    polyethylene glycol (PEG 3350) (Miralax) 17 g oral  packet 17 g  17 g Oral Daily PRN    sennosides (Senokot) tab 17.2 mg  17.2 mg Oral Nightly PRN    bisacodyl (Dulcolax) 10 MG rectal suppository 10 mg  10 mg Rectal Daily PRN    glucose (Dex4) 15 GM/59ML oral liquid 15 g  15 g Oral Q15 Min PRN    Or    glucose (Glutose) 40% oral gel 15 g  15 g Oral Q15 Min PRN    Or    glucose-vitamin C (Dex-4) chewable tab 4 tablet  4 tablet Oral Q15 Min PRN    Or    dextrose 50% injection 50 mL  50 mL Intravenous Q15 Min PRN    Or    glucose (Dex4) 15 GM/59ML oral liquid 30 g  30 g Oral Q15 Min PRN    Or    glucose (Glutose) 40% oral gel 30 g  30 g Oral Q15 Min PRN    Or    glucose-vitamin C (Dex-4) chewable tab 8 tablet  8 tablet Oral Q15 Min PRN    insulin regular human (Novolin R, Humulin R) 100 UNIT/ML injection 1-7 Units  1-7 Units Subcutaneous 4 times per day    norepinephrine (Levophed) 4 mg/250mL infusion premix  0.5-30 mcg/min Intravenous Continuous    pantoprazole (Protonix) 40 mg in sodium chloride 0.9% PF 10 mL IV push  40 mg Intravenous Q12H    insulin degludec 100 units/mL flextouch 8 Units  8 Units Subcutaneous Daily    NxStage Pure Flow 401 CRRT/PIRRT fluid 5000mL  2 L/hr CRRT Continuous    hydrALAzine (Apresoline) 20 mg/mL injection 10 mg  10 mg Intravenous Q6H PRN    LORazepam (Ativan) 2 mg/mL injection 2 mg  2 mg Intravenous Q30 Min PRN    levETIRAcetam (Keppra) 500 mg/5mL injection 500 mg  500 mg Intravenous Q24H    propofol (Diprivan) 10 mg/mL infusion premix  5-50 mcg/kg/min Intravenous Continuous       Objective:  Blood pressure 101/47, pulse 61, temperature 96.7 °F (35.9 °C), temperature source Temporal, resp. rate 14, height 66\", weight 177 lb 11.1 oz (80.6 kg), SpO2 100%.    Physical Exam:  Vitals:    06/14/24 0400 06/14/24 0500 06/14/24 0600 06/14/24 0700   BP: 124/49 113/50 103/43 101/47   Pulse: 62 59 61 61   Resp: 14 14 14 14   Temp: (!) 96.2 °F (35.7 °C)   96.7 °F (35.9 °C)   TempSrc: Temporal   Temporal   SpO2: 100% 100% 100% 100%   Weight: 177 lb  11.1 oz (80.6 kg)      Height:           General: No apparent distress, well nourished, well groomed.  Head- Normocephalic, atraumatic  Eyes- No redness or swelling  Neck- No masses or adenopathy  CV: pulses were palpable and normal, no cyanosis or edema     Neurological:     Mental Status-intubated, continues to have some very subtle myoclonic jerks.  Pupillary reflexes were slightly red bilaterally, right slightly larger than the left, some triple flexion to the painful stimulation lower extremities.    Lab Results   Component Value Date    WBC 13.2 (H) 06/14/2024    HGB 7.7 (L) 06/14/2024    HCT 23.3 (L) 06/14/2024    PLT 91.0 (L) 06/14/2024    CREATSERUM 2.45 (H) 06/14/2024    BUN 19 06/14/2024     (L) 06/14/2024    K 4.1 06/14/2024     06/14/2024    CO2 24.0 06/14/2024     (H) 06/14/2024    CA 7.5 (L) 06/14/2024    ALB 2.2 (L) 06/14/2024    ALKPHO 108 06/09/2024    BILT <0.2 (L) 06/09/2024    TP 6.1 06/09/2024     (H) 06/09/2024     (H) 06/09/2024    TSH 3.670 06/09/2024    MG 1.6 06/13/2024    PHOS 2.8 06/14/2024     06/09/2024    B12 1,320 (H) 06/10/2024    ETOH <3 06/09/2024       MRI BRAIN (CPT=70551)    Result Date: 6/14/2024  CONCLUSION:  1. Findings most compatible with global anoxic injury throughout the cerebral hemispheres bilaterally. 2. Multifocal early subacute lacunar infarctions throughout the cerebellar hemispheres bilaterally, with milder involvement of the left thalamus and left hemipons. 3. Lesser incidental findings as above.   A preliminary report was issued by the [facility] teleradiology service. There are no major discrepancies.  [facility]  Dictated by (CST): Krzysztof Rene MD on 6/14/2024 at 6:40 AM     Finalized by (CST): Krzysztof Rene MD on 6/14/2024 at 6:49 AM                 Assessment:  Patient Active Problem List   Diagnosis    Cardiac arrest (HCC)    Lactic acidosis    Acute kidney injury (HCC)    Hyperkalemia     Hyperglycemia    Elevated troponin    Transaminitis    Pleural effusion    Anoxic brain injury (HCC)    Myoclonus     Patient with cardiac arrest and most likely significant anoxic brain injury.  EEG pattern carries very poor prognosis.  CT of the head was repeated and that was still not revealing.  I discussed poor prognosis with the family on the phone, MRI confirmed the anoxic injury  Comfort care is suggested.    Yuan Branham MD

## 2024-06-14 NOTE — PLAN OF CARE
Received pt intubated and sedated. Pt unresponsive, no purposeful movement noted, pt does not follow commands. Intermittent clonic jerking observed in JUAN A upper and lower extremities, clonic jerking worsens with stimulation/movement, managed with PRN Ativan. Pt to MRI -> Results show global anoxic brain injury per radiologist.     Vital signs stable, pt remains off pressor support.     [facility] notified to restart CRRT when pt back to CCU from MRI. CRRT officially restarted at 0340.    Bed locked in lowest position, safety maintained.     Problem: NEUROLOGICAL - ADULT  Goal: Achieves stable or improved neurological status  Description: INTERVENTIONS  - Assess for and report changes in neurological status  - Initiate measures to prevent increased intracranial pressure  - Maintain blood pressure and fluid volume within ordered parameters to optimize cerebral perfusion and minimize risk of hemorrhage  - Monitor temperature, glucose, and sodium. Initiate appropriate interventions as ordered  Outcome: Not Progressing  Goal: Absence of seizures  Description: INTERVENTIONS  - Monitor for seizure activity  - Administer anti-seizure medications as ordered  - Monitor neurological status  Outcome: Not Progressing     Problem: RESPIRATORY - ADULT  Goal: Achieves optimal ventilation and oxygenation  Description: INTERVENTIONS:  - Assess for changes in respiratory status  - Assess for changes in mentation and behavior  - Position to facilitate oxygenation and minimize respiratory effort  - Oxygen supplementation based on oxygen saturation or ABGs  - Provide Smoking Cessation handout, if applicable  - Encourage broncho-pulmonary hygiene including cough, deep breathe, Incentive Spirometry  - Assess the need for suctioning and perform as needed  - Assess and instruct to report SOB or any respiratory difficulty  - Respiratory Therapy support as indicated  - Manage/alleviate anxiety  - Monitor for signs/symptoms of CO2  retention  Outcome: Progressing     Problem: CARDIOVASCULAR - ADULT  Goal: Maintains optimal cardiac output and hemodynamic stability  Description: INTERVENTIONS:  - Monitor vital signs, rhythm, and trends  - Monitor for bleeding, hypotension and signs of decreased cardiac output  - Evaluate effectiveness of vasoactive medications to optimize hemodynamic stability  - Monitor arterial and/or venous puncture sites for bleeding and/or hematoma  - Assess quality of pulses, skin color and temperature  - Assess for signs of decreased coronary artery perfusion - ex. Angina  - Evaluate fluid balance, assess for edema, trend weights  Outcome: Progressing

## 2024-06-14 NOTE — RESPIRATORY THERAPY NOTE
RESPIRATORY THERAPY PATIENT TRANSPORT NOTE    Transported from: CCU  Transported to: [facility]    Patient is intubated?:yes  Transport ventilator used?:yes  Resuscitation bag used during transport?:no   ETT placement and ventilator function were verified post-transport. Yes

## 2024-06-14 NOTE — PROGRESS NOTES
[facility] CARDIOLOGY Progress Note      Sarita Israel is a 69 year old female who I assessed as follows:  Chief Complaint   Patient presents with    Cardiac Arrest          REASON FOR CONSULTATION:    \"cardiac arrest\"            ASSESSMENT/PLAN:     IMPRESSIONS:  Cardiac arrest, may very well be related to metabolic issues from worsening renal failure and sepsis. Rhythm has been stable  Hypotension/shock, now off pressors  Elevated troponin  Acute resp failure, intubated  HTN  DM  anoxic encephalopathy. EEG with poor prognosis  Sepsis/Enterococcus faecium bacteremia   CORNEL on CKD        PLAN:  Tele reviewed  CRRT   Await to see if neurologic recovery.   MRI brain results pending. Family waiting on this  Poor prognosis                Subjective:    Intubated. Sedated. Myoclonic jerks when off sedation.    --------------------------------------------------------------------------------------------------------------------------------    HPI:         History of DM, HTN presents with not feeling well. Family was to bring patient to hospital but she arrested. 911 called. Asystole with paramedics; ACLS performed. In ER, given epi, the DARION with pulse. Then had bradycardia, given atropine. Seizure like activity in ER. Severely acidotic.    Was recently told that creat in 7s and she refused hemodialysis.    Currently pateint is intubated, sedated (no purposeful response when off sedation).             No current outpatient medications on file.      No past medical history on file.  No past surgical history on file.  No family history on file.   Social History:  Social History     Socioeconomic History    Marital status: Single     Social Determinants of Health     Food Insecurity: Unknown (6/10/2024)    Food Insecurity     Food Insecurity: Patient unable to answer   Transportation Needs: Unknown (6/10/2024)    Transportation Needs     Lack of Transportation: Patient unable to answer   Housing Stability: Unknown  (6/10/2024)    Housing Stability     Housing Instability: Patient unable to answer          Medications:  Current Facility-Administered Medications   Medication Dose Route Frequency    acetaminophen (Tylenol) tab 650 mg  650 mg Oral Q4H PRN    Or    acetaminophen (Tylenol) 160 MG/5ML oral liquid 650 mg  650 mg Oral Q4H PRN    Or    acetaminophen (Tylenol) rectal suppository 650 mg  650 mg Rectal Q4H PRN    Or    acetaminophen (Ofirmev) 10 mg/mL infusion premix 1,000 mg  1,000 mg Intravenous Q6H PRN    chlorhexidine gluconate (Peridex) 0.12 % oral solution 15 mL  15 mL Mouth/Throat BID@0800,2000    ampicillin (Omnipen) 2 g in sodium chloride 0.9% 100 mL IVPB-MBP  2 g Intravenous Q8H    dextrose 10% infusion (TPN no rate)   Intravenous Continuous PRN    pancrelipase (Lip-Prot-Amyl) (Zenpep) DR particles cap 10,000 Units  10,000 Units Per G Tube PRN    And    sodium bicarbonate tab 325 mg  325 mg Oral PRN    dextrose 5% infusion   Intravenous Daily PRN    [Held by provider] heparin (Porcine) 5000 UNIT/ML injection 5,000 Units  5,000 Units Subcutaneous Q8H SHOAIB    acetaminophen (Tylenol Extra Strength) tab 500 mg  500 mg Oral Q4H PRN    ondansetron (Zofran) 4 MG/2ML injection 4 mg  4 mg Intravenous Q6H PRN    metoclopramide (Reglan) 5 mg/mL injection 5 mg  5 mg Intravenous Q8H PRN    polyethylene glycol (PEG 3350) (Miralax) 17 g oral packet 17 g  17 g Oral Daily PRN    sennosides (Senokot) tab 17.2 mg  17.2 mg Oral Nightly PRN    bisacodyl (Dulcolax) 10 MG rectal suppository 10 mg  10 mg Rectal Daily PRN    glucose (Dex4) 15 GM/59ML oral liquid 15 g  15 g Oral Q15 Min PRN    Or    glucose (Glutose) 40% oral gel 15 g  15 g Oral Q15 Min PRN    Or    glucose-vitamin C (Dex-4) chewable tab 4 tablet  4 tablet Oral Q15 Min PRN    Or    dextrose 50% injection 50 mL  50 mL Intravenous Q15 Min PRN    Or    glucose (Dex4) 15 GM/59ML oral liquid 30 g  30 g Oral Q15 Min PRN    Or    glucose (Glutose) 40% oral gel 30 g  30 g Oral Q15  Min PRN    Or    glucose-vitamin C (Dex-4) chewable tab 8 tablet  8 tablet Oral Q15 Min PRN    insulin regular human (Novolin R, Humulin R) 100 UNIT/ML injection 1-7 Units  1-7 Units Subcutaneous 4 times per day    norepinephrine (Levophed) 4 mg/250mL infusion premix  0.5-30 mcg/min Intravenous Continuous    pantoprazole (Protonix) 40 mg in sodium chloride 0.9% PF 10 mL IV push  40 mg Intravenous Q12H    insulin degludec 100 units/mL flextouch 8 Units  8 Units Subcutaneous Daily    NxStage Pure Flow 401 CRRT/PIRRT fluid 5000mL  2 L/hr CRRT Continuous    hydrALAzine (Apresoline) 20 mg/mL injection 10 mg  10 mg Intravenous Q6H PRN    LORazepam (Ativan) 2 mg/mL injection 2 mg  2 mg Intravenous Q30 Min PRN    levETIRAcetam (Keppra) 500 mg/5mL injection 500 mg  500 mg Intravenous Q24H    propofol (Diprivan) 10 mg/mL infusion premix  5-50 mcg/kg/min Intravenous Continuous           Allergies  No Known Allergies            REVIEW OF SYSTEMS:   Patient unable to answer          EXAM:         TELE: SR          /43 (BP Location: Right arm)   Pulse 61   Temp (!) 96.2 °F (35.7 °C) (Temporal)   Resp 14   Ht 5' 6\" (1.676 m)   Wt 177 lb 11.1 oz (80.6 kg)   SpO2 100%   BMI 28.68 kg/m²   Temp (24hrs), Av.5 °F (36.4 °C), Min:96.2 °F (35.7 °C), Max:98.3 °F (36.8 °C)    Wt Readings from Last 3 Encounters:   24 177 lb 11.1 oz (80.6 kg)         Intake/Output Summary (Last 24 hours) at 2024 0628  Last data filed at 2024 0600  Gross per 24 hour   Intake 1642.6 ml   Output 524 ml   Net 1118.6 ml         GENERAL:intubated  SKIN: no rashes  HEENT: atraumatic, normocephalic, throat without erythema  NECK: supple, no bruits  LUNGS: coarse breath sounds  CARDIO: RRR without murmur or S3   GI: soft, nontender  EXTREMITIES: no edema  NEUROLOGY: not alert  PSYCH: cooperative          LABORATORY DATA:               ECG:         ECHO:          Labs:  Recent Labs   Lab 24  0820 24  1732 24  0327   GLU  80 106* 126*   BUN 14 15 19   CREATSERUM 1.75* 2.07* 2.45*   CA 7.7* 7.7* 7.5*    135* 135*   K 4.2 4.0 4.1    106 105   CO2 27.0 26.0 24.0     No results for input(s): \"TROP\" in the last 168 hours.  Recent Labs   Lab 06/14/24  0327   RBC 2.72*   HGB 7.7*   HCT 23.3*   MCV 85.7   MCH 28.3   MCHC 33.0   RDW 15.7*   NEPRELIM 11.11*   WBC 13.2*   PLT 91.0*     Recent Labs   Lab 06/09/24  2257 06/11/24  0340   TROPHS 347* 609*     --        Imaging:  XR CHEST AP PORTABLE  (CPT=71045)    Result Date: 6/11/2024  CONCLUSION:  1. Right IJ dialysis catheter tip at the RA SVC junction.  No pneumothorax. 2. CHF/fluid overload.  Left retrocardiac opacification may be related to compressive atelectasis from pleural effusion, or coexistent pneumonia.     Dictated by (CST): Reinier Tucker MD on 6/11/2024 at 5:17 PM     Finalized by (CST): Reinier Tucker MD on 6/11/2024 at 5:21 PM          CT BRAIN OR HEAD (19109)    Result Date: 6/11/2024  PROCEDURE: CT BRAIN OR HEAD (CPT=70450)  COMPARISON: [facility], CT BRAIN OR HEAD (CPT=70450), 6/10/2024, 0:25 AM.  INDICATIONS: anoxia  TECHNIQUE: CT images were obtained without contrast material.  Automated exposure control for dose reduction was used.  Dose information is transmitted to the ACR (American College of Radiology) NRDR (National Radiology Data Registry) which includes the Dose Index Registry.  Findings and impression:  No CT evidence of hypoxic brain injury  No hemorrhage or mass effect or edema  Normal midline ventricles with stable mild chronic ischemia in the periventricular and deep white matter    Dictated by (CST): Jaden Rivers MD on 6/11/2024 at 10:07 AM     Finalized by (CST): Jaden Rivers MD on 6/11/2024 at 10:09 AM                Lazarus Winn MD

## 2024-06-14 NOTE — CM/SW NOTE
[facility] follow up with pt's family via phone call. LSW left voicemail for pt's sister, but did not hear back. Per unit nursing staff, family continuing aggressive treatment at this time. [facility] will sign off. RH team will continue to be available if family decides to proceed with hospice.      LEONIDES Guevara  [facility]  [phone]

## 2024-06-15 NOTE — PROGRESS NOTES
During Bedside Shift Report with previous RN, noted the seizure activity with suctioning stimulation. Pt did not open her eyes nor squeeze hands when asked. Family at bedside, awaiting arrival of more family members.

## 2024-06-15 NOTE — PROGRESS NOTES
~0102 Pt was extubated with assist of RT. Family present in the hallway. Once the equipment was removed and pt's face cleansed, family was able to rejoin her.

## 2024-06-15 NOTE — PROGRESS NOTES
Spoke with on-call , informed her of family's plan to terminally wean pt from the ventilator as the medical prognosis is so poor. Informed  that family is very Spiritual, but it seems more prayer would help.

## 2024-06-15 NOTE — PLAN OF CARE
This Plan of Care was started before pt passed away.  See charting for notifications and cares provided.  Problem: Delirium  Goal: Minimize duration of delirium  Description: Interventions:  - Encourage use of hearing aids, eye glasses  - Promote highest level of mobility daily  - Provide frequent reorientation  - Promote wakefulness i.e. lights on, blinds open  - Promote sleep, encourage patient's normal rest cycle i.e. lights off, TV off, minimize noise and interruptions  - Encourage family to assist in orientation and promotion of home routines  Outcome: Not Progressing     Problem: RESPIRATORY - ADULT  Goal: Achieves optimal ventilation and oxygenation  Description: INTERVENTIONS:  - Assess for changes in respiratory status  - Assess for changes in mentation and behavior  - Position to facilitate oxygenation and minimize respiratory effort  - Oxygen supplementation based on oxygen saturation or ABGs  - Provide Smoking Cessation handout, if applicable  - Encourage broncho-pulmonary hygiene including cough, deep breathe, Incentive Spirometry  - Assess the need for suctioning and perform as needed  - Assess and instruct to report SOB or any respiratory difficulty  - Respiratory Therapy support as indicated  - Manage/alleviate anxiety  - Monitor for signs/symptoms of CO2 retention  Outcome: Not Progressing     Problem: CARDIOVASCULAR - ADULT  Goal: Maintains optimal cardiac output and hemodynamic stability  Description: INTERVENTIONS:  - Monitor vital signs, rhythm, and trends  - Monitor for bleeding, hypotension and signs of decreased cardiac output  - Evaluate effectiveness of vasoactive medications to optimize hemodynamic stability  - Monitor arterial and/or venous puncture sites for bleeding and/or hematoma  - Assess quality of pulses, skin color and temperature  - Assess for signs of decreased coronary artery perfusion - ex. Angina  - Evaluate fluid balance, assess for edema, trend weights  Outcome: Not  Progressing  Goal: Absence of cardiac arrhythmias or at baseline  Description: INTERVENTIONS:  - Continuous cardiac monitoring, monitor vital signs, obtain 12 lead EKG if indicated  - Evaluate effectiveness of antiarrhythmic and heart rate control medications as ordered  - Initiate emergency measures for life threatening arrhythmias  - Monitor electrolytes and administer replacement therapy as ordered  Outcome: Not Progressing     Problem: NEUROLOGICAL - ADULT  Goal: Achieves stable or improved neurological status  Description: INTERVENTIONS  - Assess for and report changes in neurological status  - Initiate measures to prevent increased intracranial pressure  - Maintain blood pressure and fluid volume within ordered parameters to optimize cerebral perfusion and minimize risk of hemorrhage  - Monitor temperature, glucose, and sodium. Initiate appropriate interventions as ordered  Outcome: Not Progressing  Goal: Absence of seizures  Description: INTERVENTIONS  - Monitor for seizure activity  - Administer anti-seizure medications as ordered  - Monitor neurological status  Outcome: Not Progressing

## 2024-06-15 NOTE — PROGRESS NOTES
Spoke w/ Susy at 0431 regarding pt's EKG changes.    Pt pronounced at 0450 by 2 RN's.     [facility], the [facility] and all services seeing Ms. Stein while she was admitted were notified of her passing.

## (undated) NOTE — [place] [address]  Authorization for Invasive Procedures  Date: 06/11/2024           Time: 9901    I hereby authorize Dr. Abel, my physician and his/her assistants (if applicable), which may include medical students, residents, and/or fellows, to perform the following surgical operation/ procedure and administer such anesthesia as may be determined necessary by my physician: Central line exchange on Sarita Israel  2.   I recognize that during the surgical operation/procedure, unforeseen conditions may necessitate additional or different procedures than those listed above.  I, therefore, further authorize and request that the above-named surgeon, assistants, or designees perform such procedures as are, in their judgment, necessary and desirable.    3.   My surgeon/physician has discussed prior to my surgery the potential benefits, risks and side effects of this procedure; the likelihood of achieving goals; and potential problems that might occur during recuperation.  They also discussed reasonable alternatives to the procedure, including risks, benefits, and side effects related to the alternatives and risks related to not receiving this procedure.  I have had all my questions answered and I acknowledge that no guarantee has been made as to the result that may be obtained.    4.   Should the need arise during my operation/procedure, which includes change of level of care prior to discharge, I also consent to the administration of blood and/or blood products.  Further, I understand that despite careful testing and screening of blood or blood products by collecting agencies, I may still be subject to ill effects as a result of receiving a blood transfusion and/or blood products.  The following are some, but not all, of the potential risks that can occur: fever and allergic reactions, hemolytic reactions, transmission of diseases such as Hepatitis, AIDS and Cytomegalovirus (CMV) and fluid  overload.  In the event that I wish to have an autologous transfusion of my own blood, or a directed donor transfusion, I will discuss this with my physician.   Check only if Refusing Blood or Blood Products  I understand refusal of blood or blood products as deemed necessary by my physician may have serious consequences to my condition to include possible death. I hereby assume responsibility for my refusal and release the hospital, its personnel, and my physicians from any responsibility for the consequences of my refusal.         o  Refuse         5.   I authorize the use of any specimen, organs, tissues, body parts or foreign objects that may be removed from my body during the operation/procedure for diagnosis, research or teaching purposes and their subsequent disposal by hospital authorities.  I also authorize the release of specimen test results and/or written reports to my treating physician on the hospital medical staff or other referring or consulting physicians involved in my care, at the discretion of the Pathologist or my treating physician.    6.   I consent to the photographing or videotaping of the operations or procedures to be performed, including appropriate portions of my body for medical, scientific, or educational purposes, provided my identity is not revealed by the pictures or by descriptive texts accompanying them.  If the procedure has been photographed/videotaped, the surgeon will obtain the original picture, image, videotape or CD.  The hospital will not be responsible for storage, release or maintenance of the picture, image, tape or CD.    7.   I consent to the presence of a  or observers in the operating room as deemed necessary by my physician or their designees.    8.   I recognize that in the event my procedure results in extended X-Ray/fluoroscopy time, I may develop a skin reaction.    9. If I have a Do Not Attempt Resuscitation (DNAR) order in place, that status  will be suspended while in the operating room, procedural suite, and during the recovery period unless otherwise explicitly stated by me (or a person authorized to consent on my behalf). The surgeon or my attending physician will determine when the applicable recovery period ends for purposes of reinstating the DNAR order.  10. Patients having a sterilization procedure: I understand that if the procedure is successful the results will be permanent and it will therefore be impossible for me to inseminate, conceive, or bear children.  I also understand that the procedure is intended to result in sterility, although the result has not been guaranteed.   11. I acknowledge that my physician has explained sedation/analgesia administration to me including the risk and benefits I consent to the administration of sedation/analgesia as may be necessary or desirable in the judgment of my physician.    I CERTIFY THAT I HAVE READ AND FULLY UNDERSTAND THE ABOVE CONSENT TO OPERATION and/or OTHER PROCEDURE.        ____________________________________       _________________________________      ______________________________  Signature of Patient         Signature of Responsible Person        Printed Name of Responsible Person        ____________________________________      _________________________________      ______________________________       Signature of Witness          Relationship to Patient                       Date                                       Time  Patient Name: Sarita Israel  : 1955    Reviewed: 2024   Printed: 2024  Medical Record #: [id number] Page 1 of 2             STATEMENT OF PHYSICIAN My signature below affirms that prior to the time of the procedure; I have explained to the patient and/or his/her legal representative, the risks and benefits involved in the proposed treatment and any reasonable alternative to the proposed treatment. I have also explained the risks and benefits  involved in refusal of the proposed treatment and alternatives to the proposed treatment and have answered the patient's questions. If I have a significant financial interest in a co-management agreement or a significant financial interest in any product or implant, or other significant relationship used in this procedure/surgery, I have disclosed this and had a discussion with my patient.     _______________________________________________________________ _____________________________  (Signature of Physician)                                                                                         (Date)                                   (Time)  Patient Name: Sarita Israel  : 1955    Reviewed: 2024   Printed: 2024  Medical Record #: [id number] Page 2 of 2

## (undated) NOTE — LETTER
CONTINUOUS GLUCOSE MONITOR AND INSULIN PUMP AGREEMENT     CONTINUOUS GLUCOSE MONITOR (CGM) (If not signed, not applicable)     CGMs are not currently approved by the FDA for use in the hospital. If you choose to continue to wear your CGM in the hospital, we ask that you agree to the following:     ?   Allow finger stick blood glucose tests to be performed using hospital glucose monitor.   ?   Insulin dosing and hypoglycemia (low blood sugar) treatment will be provided based on hospital glucose monitor        results.   ?   Remove your CGM on or before the date it is due to be removed or as ordered by physician.   ?   Remove your CGM prior to any scheduled surgery and as instructed for procedures.   ?   Remove your CGM prior to Magnetic Resonance Imaging (MRI), Computerized Tomography (CT) or x-ray as it can      damage your CGM.   ?   Inform staff of any skin irritation, redness, or other problems with your skin or CGM.     __________________________________________________________ ________________________________   Patient/Guardian Signature                                                                                  Date/Time     __________________________________________________________   Print Guardian Name     __________________________________________________________ ________________________________   Staff/RN Signature                                                                                                 Date/Time      INSULIN PUMP (If not signed, not applicable)     For your safety and best possible care, we ask that you agree to the following. If you cannot agree to the following requirements of this agreement, or, if it is not possible for you to meet these requirements, we will provide and administer insulin based on your provider’s orders.     ?   Communicate your site changes, carbohydrate intake, and all boluses to your nurse.   ?   Provide your own insulin and pump supplies.   ?   Change  your infusion site at least every 3 days, and as needed for skin irritation or two consecutive glucose readings       greater than 240 mg/dL.   ?   Make no changes to your basal rates, carb ratios, or correction factor unless directed to do so by the physician        managing your insulin pump.   ?   Notify your nurse before you eat so that your blood glucose level can be obtained with hospital glucose monitor.   ?   Report symptoms of low blood sugar to your nurse immediately.   ?   Notify your nurse of any problems with your pump that you cannot correct.     I understand that my insulin pump may be discontinued and a different method of insulin delivery will be provided for any of the following reasons:     ?   Inability to safely perform diabetes self-management activities because of the condition for which I was hospitalized,        or side effects of my treatment.   ? Inability or inconsistency in performing the diabetes self-management activities described above.     __________________________________________________________ ________________________________   Patient/Guardian Signature                                                                                 Date/Time     __________________________________________________________   Print Guardian Name     __________________________________________________________ ________________________________   Staff/RN Signature                                                                                                 Date/Time       Patient Name: Sarita Israel     : 1955                 Printed: Justine 10, 2024     Medical Record #: [id number]                                            Page         INSULIN PUMP NURSING CHECKLIST   ON ADMISSION     ? Document insulin pump in Medical Devices/Implants section of Epic (even if patient not wearing pump in hospital).   ? Use link in Insulin Pump BPA to print the CGM/Insulin Pump Agreement and Checklists.    ?  Apply patient label to Agreement and have patient read and sign it. Place on chart.   ? Add Insulin Pump LDA to the IV Assessment flowsheet.   ? Endocrinology to be consulted (except at [facility]):    ? Nolvia: Attending to initiate consult.    ?  Chema: Open endocrinologist order through BPA (no cosign required). Page on-call endocrinologist          through [employer].    ?  Balbir Fleming: Notify attending of insulin pump.     EVERY SHIFT     ? Document pump site assessment and any site changes. ADMISSION   ? Chart ALL insulin bolus doses in MAR as “Self-administered via pump”.     PROTOCOL REMINDERS     ? Insulin Pump Focused order set is to be used for all patients using an insulin pump.   ? Patient to provide own pump supplies and insulin.   ? Point of care glucose to be performed using hospital glucometer, even if wearing a continuous glucose           monitor.     NOTIFY ENDOCRINOLOGY OR MFM  FOR: (at [facility], notify attending)     Temporary discontinuation of pump infusion for more than one hour   Surgery   Change in patient condition, mental status, or compliance that prohibits ability to self-manage the insulin pump   Patient reports insulin pump not operating properly   Patient does not have appropriate insulin or supplies for insulin pump   Risk for suicide   Blood glucose outside ranges indicated in insulin pump orders       DO NOT REMOVE INSULIN PUMP WITHOUT ORDER FROM ENDOCRINOLOGIST/MGM -  or attending at [facility]     NOT PART OF PERMANENT CHART     Patient Name: Sarita Israel     : 1955                 Printed: Justine 10, 2024     Medical Record #: [id number]                                            Page  FOR: (at [facility],     CONTINUOUS GLUCOSE MONITOR (CGM) NURSING CHECKLIST     A Continuous Glucose Monitor (CGM), also referred to as a “sensor”, is a small device that takes frequent blood glucose (BG) readings, approximately every 5 minutes.   BG is measured in interstitial fluid by a tiny  electrode under the skin.   The CGM can be worn up to 10-14 days, depending on the model.   Often used along with an insulin pump, but may also be a stand-alone device.      ON ADMISSION     ?   Document CGM in Medical Devices/Implants in Admission Navigator  ?   When BPA fires, print the GM/Insulin Pump Agreement, place patient label, have patient read and sign and place on        chart.   ?   Add CGM LDA to the IV Assessment flowsheet   ?   Inform patient that HOSPITAL GLUCOSE MONITOR will be used for BG testing   ?   CGM not approved by FDA for inpatient use    ?   Frequent quality tests are performed on hospital glucose monitor                ?   Results of hospital glucose monitor cross over into electronic medical record   ?   Some medications interfere with CGM models including acetaminophen, aspirin, and Vitamin C    IMPORTANT INFORMATION     ? Point of care glucose to be performed using HOSPITAL glucose monitor.   ? Do not treat with insulin or treat hypoglycemia based on CGM values.   ? Patient to remove CGM before MRI, CT, x-ray, or any procedure that uses radiation such as ERCP,         fluoroscopic exam, IV Pyelogram, mammogram, cardiac cath, etc.   ? Exposure to above imaging may damage the CGM causing inaccurate BG readings, or prevent the         device from alarming.   ? Patient to remove CGM prior to surgery as it is often unknown if x-rays or other imaging will be used.   ? If CGM is removed or falls off, give to patient or family, or store according to hospital policy. Not all parts         are disposable and replacement can be very expensive.   ? Every shift, document CGM site assessment   ? Chart removal of CGM     NOTIFY ATTENDING/ENDOCRINOLOGIST IF:     ? Patient refuses to allow finger stick tests with hospital glucose monitor.   ? Any problems or irritation at CGM site.      NOT PART OF PERMANENT CHART  Patient Name: Sarita Israel     : 1955                 Printed: Justine 10, 2024      Medical Record #: [id number]                                            Page 1/1 24/7

## (undated) NOTE — LETTER
[facility] 2W/SW  [address] [facility] [address]  [phone]    Blood Transfusion Consent    In the course of your treatment, it may become necessary to administer a transfusion of blood or blood components. This form provides basic information concerning this procedure and, if signed by you, authorizes its administration. By signing this form, you agree that all of your questions about the administration of blood or blood products have been answered by the ordering medical professional or designee.    Description of Procedure  Blood is introduced into one of your veins, commonly in the arm, using a sterilized disposable needle. The amount of blood transfused, and whether the transfusion will be of blood or blood components is a judgement the physician will make based on your particular needs.    Risks  The transfusion is a common procedure of low risk.  MINOR AND TEMPORARY REACTIONS ARE NOT UNCOMMON, including a slight bruise, swelling or local reaction in the area where the needle pierces your skin, or a nonserious reaction to the transfused material itself, including headache, fever or mild skin reaction, such as rash.  Serious reactions are possible, though very unlikely, and include severe allergic reaction (shock) and destruction (hemolysis) of transfused blood cells.  Infectious diseases which are known to be transmitted by blood transfusion include certain types of viral Hepatitis(liver infection from a virus), Human Immunodeficiency Virus (HIV-1,2) infection, a viral infection known to cause Acquired Immunodeficiency Syndrome (AIDS), as well as certain other bacterial, viral, and parasitic diseases. While a minimal risk of acquiring an infectious disease from transfused blood exists, in accordance with the Federal and State law, all due care has been taken in donor selection and testing to avoid transmission of disease.    Alternatives  If loss of blood poses serious threats during your  treatment, THERE IS NO EFFECTIVE ALTERNATIVE TO BLOOD TRANSFUSION. However, if you have any further questions on this matter, your provider will fully explain the alternatives to you if it has not already been done.    I, ______________________________, have read/had read to me the above. I understand the matters bearing on the decision whether or not to authorize a transfusion of blood or blood components. I have no questions which have not been answered to my full satisfaction. I hereby consent to such transfusion as my physician may deem necessary or advisable in the course of my treatment.    ______________________________________________                    ___________________________  (Signature of Patient or Responsible party in case of minor,                 (Printed Name of Patient or incompetent, or unconscious patient)              Responsible Party)    ___________________________               _____________________  (Relationship to Patient if not self)                                    (Date and Time)    __________________________                                                           ______________________              (Signature of Witness)               (Printed Name of Witness)     Language line ()    Telephone/Verbal/Video Consent    __________________________                     ____________________  (Signature of 2nd Witness           (Printed Name of 2nd  Telephone/Verbal/Video Consent)           Witness)    Patient Name: Sarita Israel     : 1955                 Printed: 2024     Medical Record #: [id number]      Rev: 2023